# Patient Record
Sex: FEMALE | Race: WHITE | NOT HISPANIC OR LATINO | Employment: FULL TIME | URBAN - METROPOLITAN AREA
[De-identification: names, ages, dates, MRNs, and addresses within clinical notes are randomized per-mention and may not be internally consistent; named-entity substitution may affect disease eponyms.]

---

## 2022-03-04 ENCOUNTER — OFFICE VISIT (OUTPATIENT)
Dept: INTERNAL MEDICINE | Facility: CLINIC | Age: 52
End: 2022-03-04

## 2022-03-04 ENCOUNTER — LAB (OUTPATIENT)
Dept: LAB | Facility: HOSPITAL | Age: 52
End: 2022-03-04

## 2022-03-04 VITALS
SYSTOLIC BLOOD PRESSURE: 122 MMHG | BODY MASS INDEX: 38.73 KG/M2 | TEMPERATURE: 97.5 F | WEIGHT: 218.6 LBS | DIASTOLIC BLOOD PRESSURE: 90 MMHG | HEART RATE: 90 BPM | OXYGEN SATURATION: 99 % | HEIGHT: 63 IN

## 2022-03-04 DIAGNOSIS — M25.562 CHRONIC PAIN OF BOTH KNEES: ICD-10-CM

## 2022-03-04 DIAGNOSIS — M25.561 CHRONIC PAIN OF BOTH KNEES: ICD-10-CM

## 2022-03-04 DIAGNOSIS — Z12.11 COLON CANCER SCREENING: ICD-10-CM

## 2022-03-04 DIAGNOSIS — R53.82 CHRONIC FATIGUE: ICD-10-CM

## 2022-03-04 DIAGNOSIS — M25.50 MULTIPLE JOINT PAIN: Primary | ICD-10-CM

## 2022-03-04 DIAGNOSIS — G89.29 CHRONIC PAIN OF BOTH KNEES: ICD-10-CM

## 2022-03-04 DIAGNOSIS — M25.50 MULTIPLE JOINT PAIN: ICD-10-CM

## 2022-03-04 LAB
ALBUMIN SERPL-MCNC: 4.4 G/DL (ref 3.5–5.2)
ALBUMIN/GLOB SERPL: 1.5 G/DL
ALP SERPL-CCNC: 67 U/L (ref 39–117)
ALT SERPL W P-5'-P-CCNC: 17 U/L (ref 1–33)
ANION GAP SERPL CALCULATED.3IONS-SCNC: 9.6 MMOL/L (ref 5–15)
AST SERPL-CCNC: 20 U/L (ref 1–32)
BILIRUB SERPL-MCNC: 0.5 MG/DL (ref 0–1.2)
BUN SERPL-MCNC: 13 MG/DL (ref 6–20)
BUN/CREAT SERPL: 16 (ref 7–25)
CALCIUM SPEC-SCNC: 9.5 MG/DL (ref 8.6–10.5)
CHLORIDE SERPL-SCNC: 103 MMOL/L (ref 98–107)
CHOLEST SERPL-MCNC: 229 MG/DL (ref 0–200)
CHROMATIN AB SERPL-ACNC: 10.8 IU/ML (ref 0–14)
CO2 SERPL-SCNC: 26.4 MMOL/L (ref 22–29)
CREAT SERPL-MCNC: 0.81 MG/DL (ref 0.57–1)
CRP SERPL-MCNC: 0.36 MG/DL (ref 0–0.5)
DEPRECATED RDW RBC AUTO: 41.8 FL (ref 37–54)
EGFRCR SERPLBLD CKD-EPI 2021: 88.6 ML/MIN/1.73
ERYTHROCYTE [DISTWIDTH] IN BLOOD BY AUTOMATED COUNT: 13.1 % (ref 12.3–15.4)
ERYTHROCYTE [SEDIMENTATION RATE] IN BLOOD: 14 MM/HR (ref 0–20)
GLOBULIN UR ELPH-MCNC: 2.9 GM/DL
GLUCOSE SERPL-MCNC: 82 MG/DL (ref 65–99)
HCT VFR BLD AUTO: 40.8 % (ref 34–46.6)
HDLC SERPL-MCNC: 67 MG/DL (ref 40–60)
HGB BLD-MCNC: 13.5 G/DL (ref 12–15.9)
LDLC SERPL CALC-MCNC: 143 MG/DL (ref 0–100)
LDLC/HDLC SERPL: 2.09 {RATIO}
MCH RBC QN AUTO: 28.5 PG (ref 26.6–33)
MCHC RBC AUTO-ENTMCNC: 33.1 G/DL (ref 31.5–35.7)
MCV RBC AUTO: 86.3 FL (ref 79–97)
PLATELET # BLD AUTO: 290 10*3/MM3 (ref 140–450)
PMV BLD AUTO: 11.3 FL (ref 6–12)
POTASSIUM SERPL-SCNC: 4.5 MMOL/L (ref 3.5–5.2)
PROT SERPL-MCNC: 7.3 G/DL (ref 6–8.5)
RBC # BLD AUTO: 4.73 10*6/MM3 (ref 3.77–5.28)
SODIUM SERPL-SCNC: 139 MMOL/L (ref 136–145)
TRIGL SERPL-MCNC: 109 MG/DL (ref 0–150)
TSH SERPL DL<=0.05 MIU/L-ACNC: 2.12 UIU/ML (ref 0.27–4.2)
URATE SERPL-MCNC: 4.8 MG/DL (ref 2.4–5.7)
VLDLC SERPL-MCNC: 19 MG/DL (ref 5–40)
WBC NRBC COR # BLD: 6.86 10*3/MM3 (ref 3.4–10.8)

## 2022-03-04 PROCEDURE — 86038 ANTINUCLEAR ANTIBODIES: CPT | Performed by: NURSE PRACTITIONER

## 2022-03-04 PROCEDURE — 99204 OFFICE O/P NEW MOD 45 MIN: CPT | Performed by: NURSE PRACTITIONER

## 2022-03-04 PROCEDURE — 85652 RBC SED RATE AUTOMATED: CPT | Performed by: NURSE PRACTITIONER

## 2022-03-04 PROCEDURE — 84550 ASSAY OF BLOOD/URIC ACID: CPT | Performed by: NURSE PRACTITIONER

## 2022-03-04 PROCEDURE — 80061 LIPID PANEL: CPT | Performed by: NURSE PRACTITIONER

## 2022-03-04 PROCEDURE — 86140 C-REACTIVE PROTEIN: CPT | Performed by: NURSE PRACTITIONER

## 2022-03-04 PROCEDURE — 86431 RHEUMATOID FACTOR QUANT: CPT | Performed by: NURSE PRACTITIONER

## 2022-03-04 PROCEDURE — 86200 CCP ANTIBODY: CPT | Performed by: NURSE PRACTITIONER

## 2022-03-04 PROCEDURE — 80050 GENERAL HEALTH PANEL: CPT | Performed by: NURSE PRACTITIONER

## 2022-03-04 RX ORDER — MELOXICAM 7.5 MG/1
TABLET ORAL
Qty: 60 TABLET | Refills: 1 | Status: SHIPPED | OUTPATIENT
Start: 2022-03-04 | End: 2022-03-16

## 2022-03-04 NOTE — PROGRESS NOTES
Subjective   Елена Sellers is a 51 y.o. female here to establish care.  Chief Complaint   Patient presents with   • Establish Care       History of Present Illness     Joint pain-if he is having bilateral knee and hand pain with some associated swelling she is concerned about the possibility of autoimmune disease.  She does report that her mother has RA.  She has never had labs to have herself evaluated for this.    Mamm scheduled for next week.     Reports a normal Pap in 10/2021    Is due for colon cancer screening.  She has no personal or familial history of colon cancer.      The following portions of the patient's history were reviewed and updated as appropriate: allergies, current medications, past family history, past medical history, past social history, past surgical history and problem list.    Review of Systems   Constitutional: Positive for diaphoresis and fatigue. Negative for fever and unexpected weight loss.   Eyes: Negative for blurred vision, double vision, pain and visual disturbance.   Respiratory: Negative for cough, chest tightness, shortness of breath and wheezing.    Cardiovascular: Negative for chest pain, palpitations and leg swelling.   Gastrointestinal: Negative for abdominal pain, constipation, diarrhea, nausea and vomiting.   Genitourinary: Negative for difficulty urinating, frequency and urgency.   Musculoskeletal: Positive for arthralgias and myalgias.   Skin: Negative for color change and rash.   Neurological: Negative for dizziness, weakness, light-headedness, headache and confusion.   Hematological: Negative for adenopathy. Does not bruise/bleed easily.   Psychiatric/Behavioral:        Mood swings              Allergies   Allergen Reactions   • Codeine Unknown - Low Severity     Past Medical History:   Diagnosis Date   • COVID 01/2022    mild- cough and joint pain only     Past Surgical History:   Procedure Laterality Date   • BREAST SURGERY  2017    reduction   • HYSTERECTOMY   "2008   • KNEE SURGERY Left     torn meniscus, patellar dislocation    • TONSILLECTOMY  1974   • TUBAL ABDOMINAL LIGATION  2002     Family History   Problem Relation Age of Onset   • Arthritis Mother    • COPD Mother    • Thyroid disease Mother    • Obesity Mother    • Hypertension Mother    • Breast cancer Mother         bilat mastectomy   • Obesity Father      Social History     Socioeconomic History   • Marital status:    Tobacco Use   • Smoking status: Never Smoker   • Smokeless tobacco: Never Used   Vaping Use   • Vaping Use: Never used   Substance and Sexual Activity   • Alcohol use: Yes     Alcohol/week: 4.0 standard drinks     Types: 4 Glasses of wine per week   • Drug use: Never   • Sexual activity: Yes     Partners: Male     Birth control/protection: None     Immunization History   Administered Date(s) Administered   • COVID-19 (PFIZER) PURPLE CAP 05/09/2021, 05/30/2021, 12/06/2021   • Influenza, Unspecified 12/06/2021   • Tdap 01/01/2016       Current Outpatient Medications:   •  meloxicam (Mobic) 7.5 MG tablet, Take 1-2 pills once a day as needed for pain, Disp: 60 tablet, Rfl: 1    Objective   Blood pressure 122/90, pulse 90, temperature 97.5 °F (36.4 °C), temperature source Infrared, height 160 cm (63\"), weight 99.2 kg (218 lb 9.6 oz), SpO2 99 %, not currently breastfeeding.  Physical Exam  Vitals and nursing note reviewed.   Constitutional:       General: She is not in acute distress.     Appearance: Normal appearance. She is well-developed. She is not diaphoretic.   HENT:      Head: Normocephalic and atraumatic.   Eyes:      Conjunctiva/sclera: Conjunctivae normal.   Neck:      Vascular: No JVD.   Cardiovascular:      Rate and Rhythm: Normal rate and regular rhythm.      Heart sounds: Normal heart sounds. No murmur heard.  Pulmonary:      Effort: Pulmonary effort is normal. No respiratory distress.      Breath sounds: Normal breath sounds.   Chest:      Chest wall: No tenderness.   Abdominal: "      General: Bowel sounds are normal. There is no distension.      Palpations: Abdomen is soft.      Tenderness: There is no abdominal tenderness.   Musculoskeletal:         General: Normal range of motion.      Cervical back: Normal range of motion.      Right knee: Crepitus present. No swelling, deformity or effusion. Tenderness present.      Left knee: Crepitus present. No swelling, deformity or effusion. Tenderness present.   Skin:     General: Skin is warm and dry.      Coloration: Skin is not pale.      Findings: No erythema.   Neurological:      Mental Status: She is alert and oriented to person, place, and time.   Psychiatric:         Behavior: Behavior normal.         Thought Content: Thought content normal.         Judgment: Judgment normal.         Assessment/Plan   Diagnoses and all orders for this visit:    1. Multiple joint pain (Primary)  -     CBC (No Diff); Future  -     Comprehensive Metabolic Panel; Future  -     Lipid Panel; Future  -     CHI With / DsDNA, RNP, Sjogrens A / B, Agudelo; Future  -     Cyclic Citrul Peptide Antibody, IgG / IgA; Future  -     C-reactive Protein; Future  -     Sedimentation Rate; Future  -     Rheumatoid Factor; Future  -     Uric Acid; Future  -     XR Knee 1 or 2 View Bilateral; Future  -     TSH Rfx On Abnormal To Free T4; Future    2. Chronic pain of both knees  -     CBC (No Diff); Future  -     Comprehensive Metabolic Panel; Future  -     Lipid Panel; Future  -     CHI With / DsDNA, RNP, Sjogrens A / B, Agudelo; Future  -     Cyclic Citrul Peptide Antibody, IgG / IgA; Future  -     C-reactive Protein; Future  -     Sedimentation Rate; Future  -     Rheumatoid Factor; Future  -     Uric Acid; Future  -     XR Knee 1 or 2 View Bilateral; Future  -     TSH Rfx On Abnormal To Free T4; Future  -     meloxicam (Mobic) 7.5 MG tablet; Take 1-2 pills once a day as needed for pain  Dispense: 60 tablet; Refill: 1    3. Chronic fatigue  -     CBC (No Diff); Future  -      Comprehensive Metabolic Panel; Future  -     Lipid Panel; Future  -     CHI With / DsDNA, RNP, Sjogrens A / B, Agudelo; Future  -     Cyclic Citrul Peptide Antibody, IgG / IgA; Future  -     C-reactive Protein; Future  -     Sedimentation Rate; Future  -     Rheumatoid Factor; Future  -     Uric Acid; Future  -     XR Knee 1 or 2 View Bilateral; Future  -     TSH Rfx On Abnormal To Free T4; Future    4. Colon cancer screening  -     Cologuard - Stool, Per Rectum; Future    Will check labs to further evaluate for any autoimmune diseases that may be contributing to her knee pain.  We will also go ahead and get her started on meloxicam  As needed.  Stop for GI side effects.  We will also have her get x-rays completed of bilateral knees to further evaluate  If symptoms are worsening, we can consider physical therapy or referral to specialist    Lower risk for colon cancer so we will start screening with Cologuard.  If positive will proceed with colonoscopy, if negative will proceed with every 3 year Cologuard       Return for and need to collect labs today, FU after XRays .  Plan of care discussed with pt. They verbalized understanding and agreement.     Dictated Utilizing Dragon Dictation   Please note that portions of this note were completed with a voice recognition program.   Part of this note may be an electronic transcription/translation of spoken language to printed text using the Dragon Dictation System.

## 2022-03-06 LAB — CCP IGA+IGG SERPL IA-ACNC: 6 UNITS (ref 0–19)

## 2022-03-08 LAB — ANA SER QL: NEGATIVE

## 2022-03-09 ENCOUNTER — PRIOR AUTHORIZATION (OUTPATIENT)
Dept: INTERNAL MEDICINE | Facility: CLINIC | Age: 52
End: 2022-03-09

## 2022-03-09 ENCOUNTER — TELEPHONE (OUTPATIENT)
Dept: INTERNAL MEDICINE | Facility: CLINIC | Age: 52
End: 2022-03-09

## 2022-03-09 NOTE — TELEPHONE ENCOUNTER
Received call from [employer] this morning needing additional clinical information regarding PA. I did give the clinical information over the phone.     Ref #: [id number]  Turn around time is listed as 5 calendar days.

## 2022-03-15 NOTE — TELEPHONE ENCOUNTER
Received determination from insurance company:   Denied:   We denied your request because we did not see what we need to approve the amount of the drug you asked us to pay for, (meloxicam 7.5 milligram tablet). The health plan has a limit on the amount of this drug, 30 tablets per 30 days. We did not see why you need more than this amount. We cannot approve the amount requested, but you may fill up to the limit. We based this decision on the Food and Drug Administration Approved Label: [employer].

## 2022-03-16 ENCOUNTER — TELEPHONE (OUTPATIENT)
Dept: INTERNAL MEDICINE | Facility: CLINIC | Age: 52
End: 2022-03-16

## 2022-03-16 RX ORDER — MELOXICAM 15 MG/1
15 TABLET ORAL DAILY PRN
Qty: 30 TABLET | Refills: 0 | Status: SHIPPED | OUTPATIENT
Start: 2022-03-16 | End: 2022-04-12

## 2022-03-16 RX ORDER — METHYLPREDNISOLONE 4 MG/1
TABLET ORAL
Qty: 21 TABLET | Refills: 0 | Status: SHIPPED | OUTPATIENT
Start: 2022-03-16 | End: 2022-09-20

## 2022-03-16 NOTE — TELEPHONE ENCOUNTER
Spoke with Jasmine and advised her of these recommendations and medication administration. She verbalized a good understanding. She would like to try the steroid pack in conjunction with rest, ice, compression and elevation (which she stated that she is already doing). She would like this sent into [facility].

## 2022-03-16 NOTE — TELEPHONE ENCOUNTER
Patient has already been informed that this medication was being called in during previous telephone call.

## 2022-03-16 NOTE — TELEPHONE ENCOUNTER
Caller: Елена Sellers    Relationship: Self    Best call back number: [phone]     What is the best time to reach you: ANYTIME    Who are you requesting to speak with (clinical staff, provider,  specific staff member): CLINICAL STAFF    Do you know the name of the person who called: SELF    What was the call regarding: PATIENT STATES THAT SHE IS RETUNING A CALL TO April.    Do you require a callback: YES

## 2022-03-16 NOTE — TELEPHONE ENCOUNTER
Ok, I sent the meloxicam 15 mg. She can take it or the 7.5mg but not both.   She can try ice and compression wrap on the knee as well. (Apply a compressive ACE bandage. Rest and elevate the affected painful area.  Apply cold compresses intermittently as needed.  As pain recedes, begin normal activities slowly as tolerated.  Call if symptoms persist.)  IF she is not running a fever/chilling or having general malaise, I could send her a steroid amari to try for the knee as well. She would not take the meloxicam at the same time as the steroid though

## 2022-03-16 NOTE — TELEPHONE ENCOUNTER
[facility] for the patient to return our call, office number was provided      HUB TO READ: Please check with Jasmine and see if she picked this up already. If we need to, we can send the 15 mg meloxicam pills for her

## 2022-03-16 NOTE — TELEPHONE ENCOUNTER
Spoke with the patient, she stated that she picked up the meloxicam as just one a day because this is what her insurance would pay for until it was able to get authorized. I informed her  that it was denied for BID and we could send in a 15MG tablet. She is agreeable to this. She also wanted to let you know that her knee is swollen more and she is having difficulty walking. She stated that she will see if she can get to a [facility] to be checked out.

## 2022-03-16 NOTE — TELEPHONE ENCOUNTER
Please check with Jasmine and see if she picked this up already. If we need to, we can send the 15 mg meloxicam pills for her

## 2022-03-17 ENCOUNTER — OFFICE VISIT (OUTPATIENT)
Dept: ORTHOPEDIC SURGERY | Facility: CLINIC | Age: 52
End: 2022-03-17

## 2022-03-17 VITALS
BODY MASS INDEX: 35.44 KG/M2 | WEIGHT: 200 LBS | HEIGHT: 63 IN | SYSTOLIC BLOOD PRESSURE: 120 MMHG | DIASTOLIC BLOOD PRESSURE: 79 MMHG

## 2022-03-17 DIAGNOSIS — M25.562 LEFT KNEE PAIN, UNSPECIFIED CHRONICITY: ICD-10-CM

## 2022-03-17 DIAGNOSIS — M17.12 PRIMARY OSTEOARTHRITIS OF LEFT KNEE: Primary | ICD-10-CM

## 2022-03-17 PROCEDURE — 99204 OFFICE O/P NEW MOD 45 MIN: CPT | Performed by: ORTHOPAEDIC SURGERY

## 2022-03-17 PROCEDURE — 20610 DRAIN/INJ JOINT/BURSA W/O US: CPT | Performed by: ORTHOPAEDIC SURGERY

## 2022-03-17 RX ORDER — LIDOCAINE HYDROCHLORIDE 10 MG/ML
3 INJECTION, SOLUTION EPIDURAL; INFILTRATION; INTRACAUDAL; PERINEURAL
Status: COMPLETED | OUTPATIENT
Start: 2022-03-17 | End: 2022-03-17

## 2022-03-17 RX ORDER — TRIAMCINOLONE ACETONIDE 40 MG/ML
80 INJECTION, SUSPENSION INTRA-ARTICULAR; INTRAMUSCULAR
Status: COMPLETED | OUTPATIENT
Start: 2022-03-17 | End: 2022-03-17

## 2022-03-17 RX ADMIN — LIDOCAINE HYDROCHLORIDE 3 ML: 10 INJECTION, SOLUTION EPIDURAL; INFILTRATION; INTRACAUDAL; PERINEURAL at 10:44

## 2022-03-17 RX ADMIN — TRIAMCINOLONE ACETONIDE 80 MG: 40 INJECTION, SUSPENSION INTRA-ARTICULAR; INTRAMUSCULAR at 10:44

## 2022-03-17 NOTE — PROGRESS NOTES
Orthopaedic Clinic Note: Knee New Patient    Chief Complaint   Patient presents with   • Left Knee - Pain, Initial Evaluation        HPI  Consult from: REAL Tineo    Елена Sellers is a 51 y.o. female who presents with left knee pain for 2 month(s). Onset atraumatic and gradual in nature. Pain is localized to the medial joint line, patella and is a 4/10 on the pain scale. Pain is described as stabbing and stinging. Associated symptoms include pain, swelling and stiffness. The pain is worse with walking, lying on affected side, rising from seated position and full extension; nothing makes it better. Previous treatments have included: NSAIDS and oral steroids since symptom onset. Although some transient relief was reported with these interventions, these conservative measures have failed and symptoms have persisted. The patient is limited in daily activities and has had a significant decrease in quality of life as a result. She denies fevers, chills, or constitutional symptoms.    I have reviewed the following portions of the patient's history:History of Present Illness    Past Medical History:   Diagnosis Date   • COVID 01/2022    mild- cough and joint pain only      Past Surgical History:   Procedure Laterality Date   • BREAST SURGERY  2017    reduction   • HYSTERECTOMY  2008   • KNEE SURGERY Left     torn meniscus, patellar dislocation    • TONSILLECTOMY  1974   • TUBAL ABDOMINAL LIGATION  2002      Family History   Problem Relation Age of Onset   • Arthritis Mother    • COPD Mother    • Thyroid disease Mother    • Obesity Mother    • Hypertension Mother    • Breast cancer Mother         bilat mastectomy   • Obesity Father      Social History     Socioeconomic History   • Marital status:    Tobacco Use   • Smoking status: Never Smoker   • Smokeless tobacco: Never Used   Vaping Use   • Vaping Use: Never used   Substance and Sexual Activity   • Alcohol use: Yes     Alcohol/week: 4.0 standard drinks     " Types: 4 Glasses of wine per week   • Drug use: Never   • Sexual activity: Yes     Partners: Male     Birth control/protection: None      Current Outpatient Medications on File Prior to Visit   Medication Sig Dispense Refill   • meloxicam (MOBIC) 15 MG tablet Take 1 tablet by mouth Daily As Needed for Moderate Pain . 30 tablet 0   • methylPREDNISolone (MEDROL) 4 MG dose pack Take as directed on package instructions. 21 tablet 0     Current Facility-Administered Medications on File Prior to Visit   Medication Dose Route Frequency Provider Last Rate Last Admin   • [COMPLETED] ketorolac (TORADOL) injection 60 mg  60 mg Intramuscular Once Nancy Leal PA   60 mg at 03/16/22 1344      Allergies   Allergen Reactions   • Codeine Unknown - Low Severity        Review of Systems   Constitutional: Negative.    HENT: Negative.    Eyes: Negative.    Respiratory: Negative.    Cardiovascular: Negative.    Gastrointestinal: Negative.    Endocrine: Negative.    Genitourinary: Negative.    Musculoskeletal: Positive for arthralgias.   Skin: Negative.    Allergic/Immunologic: Negative.    Neurological: Negative.    Hematological: Negative.    Psychiatric/Behavioral: Negative.         The patient's Review of Systems was personally reviewed and confirmed as accurate.    The following portions of the patient's history were reviewed and updated as appropriate: allergies, current medications, past family history, past medical history, past social history, past surgical history and problem list.    Physical Exam  Blood pressure 120/79, height 160 cm (62.99\"), weight 90.7 kg (200 lb), not currently breastfeeding.    Body mass index is 35.44 kg/m².    GENERAL APPEARANCE: awake, alert & oriented x 3, in no acute distress, well developed, well nourished and obese  PSYCH: normal affect  LUNGS:  breathing nonlabored  EYES: sclera anicteric  CARDIOVASCULAR: palpable dorsalis pedis, palpable posterior tibial bilaterally. Capillary refill less " than 2 seconds  EXTREMITIES: no clubbing, cyanosis  GAIT:  Antalgic            Right Lower Extremity Exam:   ----------  Hip Exam  ----------  FLEXION CONTRACTURE: None  FLEXION: 110 degrees  INTERNAL ROTATION: 20 degrees at 90 degrees of flexion   EXTERNAL ROTATION: 40 degrees at 90 degrees of flexion    PAIN WITH HIP MOTION: no  ----------  Knee Exam  ----------  ALIGNMENT: Mild varus and correctable to neutral    RANGE OF MOTION:  Normal (0-120 degrees) with no extensor lag or flexion contracture  LIGAMENTOUS STABILITY:   stable to varus and valgus stress at terminal extension and 30 degrees without any evidence of laxity     STRENGTH:  5/5 knee flexion, extension. 5/5 ankle dorsiflexion and plantarflexion.     PAIN WITH PALPATION: denies tenderness to palpation about the knee, denies medial or lateral joint line pain  KNEE EFFUSION: no  PAIN WITH KNEE ROM: no  PATELLAR CREPITUS: yes, asymptomatic  SPECIAL EXAM FINDINGS:  Negative patellar compression    REFLEXES:  PATELLAR 2+/4  ACHILLES 2+/4    CLONUS: negative  STRAIGHT LEG TEST:   negative    SENSATION TO LIGHT TOUCH:  DEEP PERONEAL/SUPERFICIAL PERONEAL/SURAL/SAPHENOUS/TIBIAL:   intact    EDEMA:  no  ERYTHEMA:  no  WOUNDS/INCISIONS: no overlying skin problems.      Left Lower Extremity Exam:   ----------  Hip Exam  ----------  FLEXION CONTRACTURE: None  FLEXION: 110 degrees  INTERNAL ROTATION: 20 degrees at 90 degrees of flexion   EXTERNAL ROTATION: 40 degrees at 90 degrees of flexion    PAIN WITH HIP MOTION: no  ----------  Knee Exam  ----------  ALIGNMENT: severe varus, correctable to neutral    RANGE OF MOTION:  Decreased (10 - 100 degrees) with no extensor lag  LIGAMENTOUS STABILITY:   stable to varus and valgus stress at terminal extension and 30 degrees; retensioning of the MCL is appreciated with valgus stress at 30 degrees consistent with medial compartment degeneration     STRENGTH:  4/5 knee flexion, extension. 5/5 ankle dorsiflexion and  plantarflexion.     PAIN WITH PALPATION: global  KNEE EFFUSION: yes, mild effusion  PAIN WITH KNEE ROM: yes, global  PATELLAR CREPITUS: yes, painful and symptomatic  SPECIAL EXAM FINDINGS:  none    REFLEXES:  PATELLAR 2+/4  ACHILLES 2+/4    CLONUS: no  STRAIGHT LEG TEST:   negative    SENSATION TO LIGHT TOUCH:  DEEP PERONEAL/SUPERFICIAL PERONEAL/SURAL/SAPHENOUS/TIBIAL:   intact    EDEMA:  no  ERYTHEMA:  no  WOUNDS/INCISIONS:  no      ______________________________________________________________________  ______________________________________________________________________    RADIOGRAPHIC FINDINGS:   Indication: Left knee pain    Comparison: No prior xrays are available for comparison    Left knee(s) 4 views: moderate to severe tricompartmental arthritis with genu varum alignment, periarticular osteophytes visualized in all compartments      Assessment/Plan:   Diagnosis Plan   1. Primary osteoarthritis of left knee     2. Left knee pain, unspecified chronicity  XR Knee 4+ View Left     Patient experiencing an arthritic flareup of the left knee.  I discussed treatment options with her regarding her ongoing knee pain.  She is agreeable to intra-articular cortisone injection left knee today.  She will follow-up in 3 months for repeat assessment.    Procedure Note:  I discussed with the patient the potential benefits of performing a therapeutic injection of the left knee as well as potential risks including but not limited to infection, swelling, pain, bleeding, bruising, nerve/vessel damage, skin color changes, transient elevation in blood glucose levels, and fat atrophy. After informed consent and verifying correct patient, procedure site, and type of procedure, the area was prepped with alcohol, ethyl chloride was used to numb the skin. Via the superior lateral approach, 3cc of 1% lidocaine, 1 cc of 0.75% Marcaine and 2 cc of 40mg/ml of Kenalog were injected into the left knee. The patient tolerated the procedure  well. There were no complications. A sterile dressing was placed over the injection site.      Jesse Martines MD  03/17/22  10:45 EDT

## 2022-03-17 NOTE — PROGRESS NOTES
Procedure   Large Joint Arthrocentesis: L knee  Date/Time: 3/17/2022 10:44 AM  Consent given by: patient  Site marked: site marked  Timeout: Immediately prior to procedure a time out was called to verify the correct patient, procedure, equipment, support staff and site/side marked as required   Supporting Documentation  Indications: pain   Procedure Details  Location: knee - L knee  Preparation: Patient was prepped and draped in the usual sterile fashion  Needle size: 22 G  Approach: superior  Medications administered: 3 mL lidocaine PF 1% 1 %; 80 mg triamcinolone acetonide 40 MG/ML (1 cc Marcaine .75% NDC: 9726-2248-30; LOT: [id number]; EXP: 04/01/2023)  Patient tolerance: patient tolerated the procedure well with no immediate complications

## 2022-03-21 ENCOUNTER — TELEPHONE (OUTPATIENT)
Dept: INTERNAL MEDICINE | Facility: CLINIC | Age: 52
End: 2022-03-21

## 2022-03-21 NOTE — TELEPHONE ENCOUNTER
Received the patient's Cologuard results via fax. Reviewed them with Nisreen Rosas who stated that her test was Negative and to repeat in three years.     Patient was informed of results, verbalized a good understanding.

## 2022-04-12 RX ORDER — MELOXICAM 15 MG/1
TABLET ORAL
Qty: 30 TABLET | Refills: 0 | Status: SHIPPED | OUTPATIENT
Start: 2022-04-12 | End: 2022-05-26 | Stop reason: SDUPTHER

## 2022-04-12 NOTE — TELEPHONE ENCOUNTER
Rx Refill Note  Requested Prescriptions     Pending Prescriptions Disp Refills   • meloxicam (MOBIC) 15 MG tablet [Pharmacy Med Name: MELOXICAM 15 MG TABLET 15 Tablet] 30 tablet 0     Sig: TAKE ONE TABLET BY MOUTH EVERY DAY AS NEEDED FOR MODERATE PAIN      Last filled:  Last office visit with prescribing clinician: 3/4/2022      Next office visit with prescribing clinician: Visit date not found     April YASMIN Galloway MA  04/12/22, 14:30 EDT     Please advise if refill is appropriate as this was a medication prescribed for an acute indication.

## 2022-05-27 RX ORDER — METHYLPREDNISOLONE 4 MG/1
TABLET ORAL
Qty: 21 TABLET | Refills: 0 | OUTPATIENT
Start: 2022-05-27

## 2022-05-27 RX ORDER — MELOXICAM 15 MG/1
15 TABLET ORAL DAILY
Qty: 30 TABLET | Refills: 0 | Status: SHIPPED | OUTPATIENT
Start: 2022-05-27 | End: 2022-06-17 | Stop reason: ALTCHOICE

## 2022-06-17 ENCOUNTER — OFFICE VISIT (OUTPATIENT)
Dept: ORTHOPEDIC SURGERY | Facility: CLINIC | Age: 52
End: 2022-06-17

## 2022-06-17 VITALS
SYSTOLIC BLOOD PRESSURE: 132 MMHG | WEIGHT: 216.4 LBS | BODY MASS INDEX: 38.34 KG/M2 | DIASTOLIC BLOOD PRESSURE: 74 MMHG | HEIGHT: 63 IN

## 2022-06-17 DIAGNOSIS — M17.12 PRIMARY OSTEOARTHRITIS OF LEFT KNEE: Primary | ICD-10-CM

## 2022-06-17 PROCEDURE — 99214 OFFICE O/P EST MOD 30 MIN: CPT | Performed by: ORTHOPAEDIC SURGERY

## 2022-06-17 PROCEDURE — 20610 DRAIN/INJ JOINT/BURSA W/O US: CPT | Performed by: ORTHOPAEDIC SURGERY

## 2022-06-17 RX ORDER — LIDOCAINE HYDROCHLORIDE 10 MG/ML
3 INJECTION, SOLUTION EPIDURAL; INFILTRATION; INTRACAUDAL; PERINEURAL
Status: COMPLETED | OUTPATIENT
Start: 2022-06-17 | End: 2022-06-17

## 2022-06-17 RX ORDER — NAPROXEN 500 MG/1
500 TABLET ORAL 2 TIMES DAILY WITH MEALS
Qty: 60 TABLET | Refills: 3 | Status: SHIPPED | OUTPATIENT
Start: 2022-06-17 | End: 2023-01-24 | Stop reason: SDUPTHER

## 2022-06-17 RX ORDER — TRIAMCINOLONE ACETONIDE 40 MG/ML
80 INJECTION, SUSPENSION INTRA-ARTICULAR; INTRAMUSCULAR
Status: COMPLETED | OUTPATIENT
Start: 2022-06-17 | End: 2022-06-17

## 2022-06-17 RX ADMIN — LIDOCAINE HYDROCHLORIDE 3 ML: 10 INJECTION, SOLUTION EPIDURAL; INFILTRATION; INTRACAUDAL; PERINEURAL at 11:10

## 2022-06-17 RX ADMIN — TRIAMCINOLONE ACETONIDE 80 MG: 40 INJECTION, SUSPENSION INTRA-ARTICULAR; INTRAMUSCULAR at 11:10

## 2022-06-17 NOTE — PROGRESS NOTES
Orthopaedic Clinic Note: Knee Established Patient    Chief Complaint   Patient presents with   • Follow-up     3 months- Primary osteoarthritis of left knee         HPI    It has been 3  month(s) since Ms. Sellers's last visit. She returns to clinic today for follow-up left knee osteoarthritis.  She is previously seen 3 months ago and underwent intra-articular injection in the knee.  She states the injection provided about a month of relief before pain returned.  She comes to clinic today rating her pain a 3/10 on the pain scale.  She states that with physical activity her pain increases significantly.  She is having recurrent swelling and stiffness in the knee that is been refractory to the meloxicam.  She states that over-the-counter Aleve does help somewhat better than the meloxicam.  Overall she is doing about the same.    Past Medical History:   Diagnosis Date   • COVID 01/2022    mild- cough and joint pain only      Past Surgical History:   Procedure Laterality Date   • BREAST SURGERY  2017    reduction   • HYSTERECTOMY  2008   • KNEE SURGERY Left     torn meniscus, patellar dislocation    • TONSILLECTOMY  1974   • TUBAL ABDOMINAL LIGATION  2002      Family History   Problem Relation Age of Onset   • Arthritis Mother    • COPD Mother    • Thyroid disease Mother    • Obesity Mother    • Hypertension Mother    • Breast cancer Mother         bilat mastectomy   • Obesity Father      Social History     Socioeconomic History   • Marital status:    Tobacco Use   • Smoking status: Never Smoker   • Smokeless tobacco: Never Used   Vaping Use   • Vaping Use: Never used   Substance and Sexual Activity   • Alcohol use: Yes     Alcohol/week: 4.0 standard drinks     Types: 4 Glasses of wine per week   • Drug use: Never   • Sexual activity: Yes     Partners: Male     Birth control/protection: None      Current Outpatient Medications on File Prior to Visit   Medication Sig Dispense Refill   • [DISCONTINUED] meloxicam  "(MOBIC) 15 MG tablet Take 1 tablet by mouth Daily. 30 tablet 0   • methylPREDNISolone (MEDROL) 4 MG dose pack Take as directed on package instructions. 21 tablet 0     No current facility-administered medications on file prior to visit.      Allergies   Allergen Reactions   • Codeine Unknown - Low Severity        Review of Systems   Constitutional: Negative.    HENT: Negative.    Eyes: Negative.    Respiratory: Negative.    Cardiovascular: Negative.    Gastrointestinal: Negative.    Endocrine: Negative.    Genitourinary: Negative.    Musculoskeletal: Positive for arthralgias.   Skin: Negative.    Allergic/Immunologic: Negative.    Neurological: Negative.    Hematological: Negative.    Psychiatric/Behavioral: Negative.         The patient's Review of Systems was personally reviewed and confirmed as accurate.    Physical Exam  Blood pressure 132/74, height 160 cm (62.99\"), weight 98.2 kg (216 lb 6.4 oz), not currently breastfeeding.    Body mass index is 38.34 kg/m².    GENERAL APPEARANCE: awake, alert, oriented, in no acute distress and well developed, well nourished  LUNGS:  breathing nonlabored  EXTREMITIES: no clubbing, cyanosis  PERIPHERAL PULSES: palpable dorsalis pedis and posterior tibial pulses bilaterally.    GAIT:  Antalgic        ----------  Left Knee Exam:  ----------  ALIGNMENT: severe varus, correctable to neutral  ----------  RANGE OF MOTION:  Decreased (10 - 110 degrees) with no extensor lag  LIGAMENTOUS STABILITY:   stable to varus and valgus stress at terminal extension and 30 degrees; retensioning of the MCL is appreciated with valgus stress at 30 degrees consistent with medial compartment degeneration  ----------  STRENGTH:  KNEE FLEXION 5/5  KNEE EXTENSION  5/5  ANKLE DORSIFLEXION  5/5  ANKLE PLANTARFLEXION  5/5  ----------  PAIN WITH PALPATION:medial joint line and anterior knee  KNEE EFFUSION: yes, trace effusion  PAIN WITH KNEE ROM: yes  PATELLAR CREPITUS:  yes, painful and " symptomatic  ----------  SENSATION TO LIGHT TOUCH:  DEEP PERONEAL/SUPERFICIAL PERONEAL/SURAL/SAPHENOUS/TIBIAL:    intact  ----------  EDEMA:  no  ERYTHEMA:    no  WOUNDS/INCISIONS:   no  _____________________________________________________________________  _____________________________________________________________________    RADIOGRAPHIC FINDINGS:   No new imaging today    Assessment/Plan:   Diagnosis Plan   1. Primary osteoarthritis of left knee  naproxen (NAPROSYN) 500 MG tablet     Patient is experiencing recurrent arthritis of the knee.  I discussed treatment options with her.  She wishes to pursue prescription anti-inflammatory treatment.  We will discontinue the Mobic and place her on prescription strength Naprosyn.  In addition this will repeat cortisone injection in the left knee today.  She will follow-up in 3 months for repeat assessment.  X-ray 4 views left knee on return.    Procedure Note:  I discussed with the patient the potential benefits of performing a therapeutic injection of the left knee as well as potential risks including but not limited to infection, swelling, pain, bleeding, bruising, nerve/vessel damage, skin color changes, transient elevation in blood glucose levels, and fat atrophy. After informed consent and verifying correct patient, procedure site, and type of procedure, the area was prepped with alcohol, ethyl chloride was used to numb the skin. Via the superolateral approach, 3cc of 1% lidocaine, 1 cc of 0.75% Marcaine and 2 cc of 40mg/ml of Kenalog were injected into the left knee. The patient tolerated the procedure well. There were no complications. A sterile dressing was placed over the injection site.        Jesse Martines MD  06/17/22  11:34 EDT

## 2022-06-17 NOTE — PROGRESS NOTES
Procedure   Large Joint Arthrocentesis: L knee  Date/Time: 6/17/2022 11:10 AM  Consent given by: patient  Site marked: site marked  Timeout: Immediately prior to procedure a time out was called to verify the correct patient, procedure, equipment, support staff and site/side marked as required   Supporting Documentation  Indications: pain   Procedure Details  Location: knee - L knee  Preparation: Patient was prepped and draped in the usual sterile fashion  Needle size: 22 G  Approach: anterolateral  Medications administered: 3 mL lidocaine PF 1% 1 %; 80 mg triamcinolone acetonide 40 MG/ML (1 cc Marcaine .75% NDC: 3251-5728-31; LOT: [id number]; EXP: 04/01/2023)  Patient tolerance: patient tolerated the procedure well with no immediate complications

## 2022-09-20 ENCOUNTER — OFFICE VISIT (OUTPATIENT)
Dept: ORTHOPEDIC SURGERY | Facility: CLINIC | Age: 52
End: 2022-09-20

## 2022-09-20 VITALS
BODY MASS INDEX: 38.06 KG/M2 | HEIGHT: 63 IN | WEIGHT: 214.8 LBS | DIASTOLIC BLOOD PRESSURE: 110 MMHG | SYSTOLIC BLOOD PRESSURE: 166 MMHG

## 2022-09-20 DIAGNOSIS — M17.12 PRIMARY OSTEOARTHRITIS OF LEFT KNEE: Primary | ICD-10-CM

## 2022-09-20 PROCEDURE — 20610 DRAIN/INJ JOINT/BURSA W/O US: CPT | Performed by: ORTHOPAEDIC SURGERY

## 2022-09-20 PROCEDURE — 99214 OFFICE O/P EST MOD 30 MIN: CPT | Performed by: ORTHOPAEDIC SURGERY

## 2022-09-20 RX ORDER — BUPIVACAINE HYDROCHLORIDE 2.5 MG/ML
3 INJECTION, SOLUTION EPIDURAL; INFILTRATION; INTRACAUDAL
Status: COMPLETED | OUTPATIENT
Start: 2022-09-20 | End: 2022-09-20

## 2022-09-20 RX ORDER — PREGABALIN 75 MG/1
75 CAPSULE ORAL ONCE
Status: CANCELLED | OUTPATIENT
Start: 2022-09-20 | End: 2022-09-20

## 2022-09-20 RX ORDER — MELOXICAM 7.5 MG/1
15 TABLET ORAL ONCE
Status: CANCELLED | OUTPATIENT
Start: 2022-09-20 | End: 2022-09-20

## 2022-09-20 RX ORDER — LIDOCAINE HYDROCHLORIDE 10 MG/ML
3 INJECTION, SOLUTION EPIDURAL; INFILTRATION; INTRACAUDAL; PERINEURAL
Status: COMPLETED | OUTPATIENT
Start: 2022-09-20 | End: 2022-09-20

## 2022-09-20 RX ORDER — ACETAMINOPHEN 325 MG/1
1000 TABLET ORAL ONCE
Status: CANCELLED | OUTPATIENT
Start: 2022-09-20 | End: 2022-09-20

## 2022-09-20 RX ORDER — TRIAMCINOLONE ACETONIDE 40 MG/ML
80 INJECTION, SUSPENSION INTRA-ARTICULAR; INTRAMUSCULAR
Status: COMPLETED | OUTPATIENT
Start: 2022-09-20 | End: 2022-09-20

## 2022-09-20 RX ADMIN — BUPIVACAINE HYDROCHLORIDE 3 ML: 2.5 INJECTION, SOLUTION EPIDURAL; INFILTRATION; INTRACAUDAL at 08:42

## 2022-09-20 RX ADMIN — LIDOCAINE HYDROCHLORIDE 3 ML: 10 INJECTION, SOLUTION EPIDURAL; INFILTRATION; INTRACAUDAL; PERINEURAL at 08:42

## 2022-09-20 RX ADMIN — TRIAMCINOLONE ACETONIDE 80 MG: 40 INJECTION, SUSPENSION INTRA-ARTICULAR; INTRAMUSCULAR at 08:42

## 2022-09-20 NOTE — PROGRESS NOTES
Orthopaedic Clinic Note: Knee Established Patient    Chief Complaint   Patient presents with   • Follow-up     3 month recheck - Primary osteoarthritis of left knee         HPI    It has been 3  month(s) since Ms. Sellers's last visit. She returns to clinic today for Follow-up left knee osteoarthritis.  She underwent cortisone injection left knee 3 months ago.  The injection worked well for about 2 months.  Pain is returned.  Rates her pain 6/10 on the pain scale.  She is having recurrent swelling and stiffness and pain is worse with walking weightbearing activities.  She denies fevers chills or constitutional symptoms.  Overall she is doing about the same.    Past Medical History:   Diagnosis Date   • COVID 01/2022    mild- cough and joint pain only      Past Surgical History:   Procedure Laterality Date   • BREAST SURGERY  2017    reduction   • HYSTERECTOMY  2008   • KNEE SURGERY Left     torn meniscus, patellar dislocation    • TONSILLECTOMY  1974   • TUBAL ABDOMINAL LIGATION  2002      Family History   Problem Relation Age of Onset   • Arthritis Mother    • COPD Mother    • Thyroid disease Mother    • Obesity Mother    • Hypertension Mother    • Breast cancer Mother         bilat mastectomy   • Obesity Father      Social History     Socioeconomic History   • Marital status:    Tobacco Use   • Smoking status: Never Smoker   • Smokeless tobacco: Never Used   Vaping Use   • Vaping Use: Never used   Substance and Sexual Activity   • Alcohol use: Yes     Alcohol/week: 4.0 standard drinks     Types: 4 Glasses of wine per week   • Drug use: Never   • Sexual activity: Yes     Partners: Male     Birth control/protection: None      Current Outpatient Medications on File Prior to Visit   Medication Sig Dispense Refill   • naproxen (NAPROSYN) 500 MG tablet Take 1 tablet by mouth 2 (Two) Times a Day With Meals. 60 tablet 3   • [DISCONTINUED] methylPREDNISolone (MEDROL) 4 MG dose pack Take as directed on package  "instructions. 21 tablet 0     No current facility-administered medications on file prior to visit.      Allergies   Allergen Reactions   • Codeine Unknown - Low Severity        Review of Systems   Constitutional: Negative.    HENT: Negative.    Eyes: Negative.    Respiratory: Negative.    Cardiovascular: Negative.    Gastrointestinal: Negative.    Endocrine: Negative.    Genitourinary: Negative.    Musculoskeletal: Positive for arthralgias.   Skin: Negative.    Allergic/Immunologic: Negative.    Neurological: Negative.    Hematological: Negative.    Psychiatric/Behavioral: Negative.         The patient's Review of Systems was personally reviewed and confirmed as accurate.    Physical Exam  Blood pressure (!) 166/110, height 160 cm (62.99\"), weight 97.4 kg (214 lb 12.8 oz), not currently breastfeeding.    Body mass index is 38.06 kg/m².    GENERAL APPEARANCE: awake, alert, oriented, in no acute distress, well developed, well nourished and obese  LUNGS:  breathing nonlabored  EXTREMITIES: no clubbing, cyanosis  PERIPHERAL PULSES: palpable dorsalis pedis and posterior tibial pulses bilaterally.    GAIT:  Antalgic        ----------  Left Knee Exam:  ----------  ALIGNMENT: severe varus, correctable to neutral  ----------  RANGE OF MOTION:  Decreased (10 - 110 degrees) with no extensor lag  LIGAMENTOUS STABILITY:   stable to varus and valgus stress at terminal extension and 30 degrees; retensioning of the MCL is appreciated with valgus stress at 30 degrees consistent with medial compartment degeneration  ----------  STRENGTH:  KNEE FLEXION 5/5  KNEE EXTENSION  5/5  ANKLE DORSIFLEXION  5/5  ANKLE PLANTARFLEXION  5/5  ----------  PAIN WITH PALPATION:medial joint line and anterior knee  KNEE EFFUSION: yes, mild effusion  PAIN WITH KNEE ROM: yes  PATELLAR CREPITUS:  yes, painful and symptomatic  ----------  SENSATION TO LIGHT TOUCH:  DEEP PERONEAL/SUPERFICIAL PERONEAL/SURAL/SAPHENOUS/TIBIAL:    intact  ----------  EDEMA:  " no  ERYTHEMA:    no  WOUNDS/INCISIONS:   no  _____________________________________________________________________  _____________________________________________________________________    RADIOGRAPHIC FINDINGS:   Indication: Left knee pain    Comparison: Todays xrays were compared to previous xrays from 3/17/2022    Knee films: moderate to severe tricompartmental arthritis with genu varum alignment with bone-on-bone articulation medial compartment, periarticular osteophytes visualized in all compartments and Radiographs demonstrate worsening deformity with advancing arthritic changes and wear compared to prior radiographs.    Assessment/Plan:   Diagnosis Plan   1. Primary osteoarthritis of left knee  XR Knee 4+ View Left    Case Request    CBC and Differential    Comprehensive metabolic panel    Protime-INR    APTT    Hemoglobin A1c    ECG 12 Lead    Nicotine & Metabolite, Quant    Case Request     Patient has end-stage arthritis left knee.  She is ready to proceed to total knee arthroplasty.  We will proceed with a cortisone injection in the left knee today followed by scheduling surgery 3 months out from today's injection date.  She will subsequently follow-up 3 weeks after her surgery date.    Procedure Note:  I discussed with the patient the potential benefits of performing a therapeutic injection of the left knee as well as potential risks including but not limited to infection, swelling, pain, bleeding, bruising, nerve/vessel damage, skin color changes, transient elevation in blood glucose levels, and fat atrophy. After informed consent and verifying correct patient, procedure site, and type of procedure, the area was prepped with alcohol, ethyl chloride was used to numb the skin. Via the superior lateral approach, 3cc of 1% lidocaine, 3 cc of 0.25% Marcaine and 2 cc of 40mg/ml of Kenalog were injected into the left knee. The patient tolerated the procedure well. There were no complications. A sterile  dressing was placed over the injection site.    The patient has clinical and radiographic evidence of end-stage left knee joint degeneration. Conservative measures have been tried for 3 months or longer, but have failed to adequately treat or improve the patient's symptoms. Pain is restricting the patient's daily activities as well as quality of life. The recommendation at this time is to proceed with a left total arthroplasty with the goal to improve patient function and pain. The risks, benefits, potential complications, and alternatives were discussed with the patient in detail. Risks included but were not limited to bleeding, infection, anesthesia risks, damage to neurovascular structures, osteolysis, aseptic loosening, instability, dislocation, pain, continued pain, iatrogenic fracture, possible need for future surgery including the potential for amputation, blood clots, myocardial infarction, stroke, and death. Gifty-operative blood management and the potential for blood transfusion were discussed with risks and options clearly outlined. Specific details of the surgical procedure, hospitalization, recovery, rehabilitation, and long-term precautions were also presented. Pre-operative teaching was provided. Implant/prosthesis selection was outlined, and the many options available were explained; the final choice will be made at the time of the procedure to match the anatomy and condition of the bone, ligaments, tendons, and muscles. Given this instruction, the patient elected to proceed with the left total knee arthroplasty. The patient will be seen by pre-admission testing for pre-operative optimization and risk assessment and will be scheduled for surgery once this is completed.    The patient is considered standard risk for DVT based on patient risk factors and will be placed on aspirin postoperatively for DVT prophylaxis.          Jesse Martines MD  09/20/22  08:45 EDT

## 2022-09-20 NOTE — PROGRESS NOTES
Procedure   Large Joint Arthrocentesis: L knee  Date/Time: 9/20/2022 8:42 AM  Consent given by: patient  Site marked: site marked  Timeout: Immediately prior to procedure a time out was called to verify the correct patient, procedure, equipment, support staff and site/side marked as required   Supporting Documentation  Indications: pain   Procedure Details  Location: knee - L knee  Preparation: Patient was prepped and draped in the usual sterile fashion  Needle size: 22 G  Approach: superior  Medications administered: 3 mL bupivacaine (PF) 0.25 %; 3 mL lidocaine PF 1% 1 %; 80 mg triamcinolone acetonide 40 MG/ML  Patient tolerance: patient tolerated the procedure well with no immediate complications

## 2022-12-22 ENCOUNTER — PRE-ADMISSION TESTING (OUTPATIENT)
Dept: PREADMISSION TESTING | Facility: HOSPITAL | Age: 52
End: 2022-12-22

## 2022-12-22 VITALS — WEIGHT: 218.03 LBS | BODY MASS INDEX: 38.63 KG/M2 | HEIGHT: 63 IN

## 2022-12-22 DIAGNOSIS — M17.12 PRIMARY OSTEOARTHRITIS OF LEFT KNEE: ICD-10-CM

## 2022-12-22 LAB
ALBUMIN SERPL-MCNC: 4.4 G/DL (ref 3.5–5.2)
ALBUMIN/GLOB SERPL: 1.6 G/DL
ALP SERPL-CCNC: 64 U/L (ref 39–117)
ALT SERPL W P-5'-P-CCNC: 11 U/L (ref 1–33)
ANION GAP SERPL CALCULATED.3IONS-SCNC: 10 MMOL/L (ref 5–15)
APTT PPP: 28.4 SECONDS (ref 22–39)
AST SERPL-CCNC: 16 U/L (ref 1–32)
BASOPHILS # BLD AUTO: 0.02 10*3/MM3 (ref 0–0.2)
BASOPHILS NFR BLD AUTO: 0.2 % (ref 0–1.5)
BILIRUB SERPL-MCNC: 0.5 MG/DL (ref 0–1.2)
BUN SERPL-MCNC: 12 MG/DL (ref 6–20)
BUN/CREAT SERPL: 14 (ref 7–25)
CALCIUM SPEC-SCNC: 9.3 MG/DL (ref 8.6–10.5)
CHLORIDE SERPL-SCNC: 106 MMOL/L (ref 98–107)
CO2 SERPL-SCNC: 25 MMOL/L (ref 22–29)
CREAT SERPL-MCNC: 0.86 MG/DL (ref 0.57–1)
DEPRECATED RDW RBC AUTO: 43.3 FL (ref 37–54)
EGFRCR SERPLBLD CKD-EPI 2021: 81.9 ML/MIN/1.73
EOSINOPHIL # BLD AUTO: 0.07 10*3/MM3 (ref 0–0.4)
EOSINOPHIL NFR BLD AUTO: 0.9 % (ref 0.3–6.2)
ERYTHROCYTE [DISTWIDTH] IN BLOOD BY AUTOMATED COUNT: 13.9 % (ref 12.3–15.4)
GLOBULIN UR ELPH-MCNC: 2.8 GM/DL
GLUCOSE SERPL-MCNC: 109 MG/DL (ref 65–99)
HBA1C MFR BLD: 5 % (ref 4.8–5.6)
HCT VFR BLD AUTO: 39.2 % (ref 34–46.6)
HGB BLD-MCNC: 13.2 G/DL (ref 12–15.9)
IMM GRANULOCYTES # BLD AUTO: 0.03 10*3/MM3 (ref 0–0.05)
IMM GRANULOCYTES NFR BLD AUTO: 0.4 % (ref 0–0.5)
INR PPP: 0.96 (ref 0.84–1.13)
LYMPHOCYTES # BLD AUTO: 3.06 10*3/MM3 (ref 0.7–3.1)
LYMPHOCYTES NFR BLD AUTO: 37.5 % (ref 19.6–45.3)
MCH RBC QN AUTO: 28.8 PG (ref 26.6–33)
MCHC RBC AUTO-ENTMCNC: 33.7 G/DL (ref 31.5–35.7)
MCV RBC AUTO: 85.4 FL (ref 79–97)
MONOCYTES # BLD AUTO: 0.72 10*3/MM3 (ref 0.1–0.9)
MONOCYTES NFR BLD AUTO: 8.8 % (ref 5–12)
NEUTROPHILS NFR BLD AUTO: 4.26 10*3/MM3 (ref 1.7–7)
NEUTROPHILS NFR BLD AUTO: 52.2 % (ref 42.7–76)
NRBC BLD AUTO-RTO: 0 /100 WBC (ref 0–0.2)
PLATELET # BLD AUTO: 279 10*3/MM3 (ref 140–450)
PMV BLD AUTO: 10.5 FL (ref 6–12)
POTASSIUM SERPL-SCNC: 3.9 MMOL/L (ref 3.5–5.2)
PROT SERPL-MCNC: 7.2 G/DL (ref 6–8.5)
PROTHROMBIN TIME: 12.6 SECONDS (ref 11.4–14.4)
QT INTERVAL: 366 MS
QTC INTERVAL: 417 MS
RBC # BLD AUTO: 4.59 10*6/MM3 (ref 3.77–5.28)
SODIUM SERPL-SCNC: 141 MMOL/L (ref 136–145)
WBC NRBC COR # BLD: 8.16 10*3/MM3 (ref 3.4–10.8)

## 2022-12-22 PROCEDURE — 36415 COLL VENOUS BLD VENIPUNCTURE: CPT

## 2022-12-22 PROCEDURE — G0480 DRUG TEST DEF 1-7 CLASSES: HCPCS

## 2022-12-22 PROCEDURE — 83036 HEMOGLOBIN GLYCOSYLATED A1C: CPT

## 2022-12-22 PROCEDURE — 85730 THROMBOPLASTIN TIME PARTIAL: CPT

## 2022-12-22 PROCEDURE — 85610 PROTHROMBIN TIME: CPT

## 2022-12-22 PROCEDURE — 93010 ELECTROCARDIOGRAM REPORT: CPT | Performed by: INTERNAL MEDICINE

## 2022-12-22 PROCEDURE — 80053 COMPREHEN METABOLIC PANEL: CPT

## 2022-12-22 PROCEDURE — 93005 ELECTROCARDIOGRAM TRACING: CPT

## 2022-12-22 PROCEDURE — 85025 COMPLETE CBC W/AUTO DIFF WBC: CPT

## 2022-12-22 NOTE — PAT
An arrival time for procedure was not provided during PAT visit. If patient had any questions or concerns about their arrival time, they were instructed to contact their surgeon/physician.  Additionally, if the patient referred to an arrival time that was acquired from their my chart account, patient was encouraged to verify that time with their surgeon/physician. Arrival times are NOT provided in Pre Admission Testing Department.    Patient viewed general PAT education video as instructed in their preoperative information received from their surgeon.  Patient stated the general PAT education video was viewed in its entirety and survey completed.  Copies of PAT general education handouts (Incentive Spirometry, Meds to Beds Program, Patient Belongings, Pre-op skin preparation instructions, Blood Glucose testing, Visitor policy, Surgery FAQ, Code H) distributed to patient if not printed. Education related to the PAT pass and skin preparation for surgery (if applicable) completed in PAT as a reinforcement to PAT education video. Patient instructed to return PAT pass provided today as well as completed skin preparation sheet (if applicable) on the day of procedure.     Additionally if patient had not viewed video yet but intended to view it at home or in our waiting area, then referred them to the handout with QR code/link provided during PAT visit.  Instructed patient to complete survey after viewing the video in its entirety.  Encouraged patient/family to read PAT general education handouts thoroughly and notify PAT staff with any questions or concerns. Patient verbalized understanding of all information and priority content.    Patient instructed to drink 20 ounces of Gatorade and it needs to be completed 1 hour (for Main OR patients) or 2 hours (scheduled  section & [facility] patients) before given arrival time for procedure (NO RED Gatorade)    Patient verbalized understanding.    Patient denies any current  skin issues.     Patient to apply Chlorhexadine wipes  to surgical area (as instructed) the night before procedure and the AM of procedure. Wipes provided.    Prescription for Bactroban (if prescribed) and Chlorhexidine shower called into patient's pharmacy or [facility] pharmacy by patient's surgeon.  Reinforced with patient to  the prescription from applicable pharmacy if they haven't already.  Verbal and written instructions given regarding proper use of the Bactroban (if prescribed) and Chlorhexidine to patient and/or famlily during PAT visit. Patient/family also instructed to complete checklist and return it to Pre-op on the day of surgery.  Patient and/or family verbalized understanding.    Discussed with patient options for receiving total joint replacement education and assessed patient's ability and preference. Joint Replacement Guide given to patient during PAT visit since not received a copy within the last year. Encouraged patient/family to read guide thoroughly and notify PAT staff with any questions or concerns. Handout provided directing patient to links to watch online videos related to joint replacement surgery on the [facility] website. The handout gives detailed instructions for joining an online joint replacement class through Zoom or phone conference offered on Thursdays. Patient agreed to participate by watching videos online. VIDEOS TEMPORARILY UNAVAILABLE. PATIENT AGREED TO READ JOINT BOOK AND CALL WITH ANY CONCERNS OR QUESTIONS. Patient verbalized understanding of instructions and to complete the online learning tool survey. Encouraged to share information with family and/or . An overview of the joint replacement education was provided during the visit including general perioperative instructions that are routine for all surgical patients (PAT PASS, wipes, directions to pre-op, etc.).    Clean catch urinalysis not indicated because patient denied recent urinary frequency, urinary  urgency, burning/pain upon urination, or flank pain. No recent UTIs.

## 2022-12-29 LAB
COTININE SERPL-MCNC: <1 NG/ML
NICOTINE SERPL-MCNC: <1 NG/ML

## 2023-01-03 ENCOUNTER — ANESTHESIA EVENT (OUTPATIENT)
Dept: PERIOP | Facility: HOSPITAL | Age: 53
End: 2023-01-03
Payer: COMMERCIAL

## 2023-01-03 RX ORDER — SODIUM CHLORIDE 0.9 % (FLUSH) 0.9 %
10 SYRINGE (ML) INJECTION EVERY 12 HOURS SCHEDULED
Status: CANCELLED | OUTPATIENT
Start: 2023-01-03

## 2023-01-03 RX ORDER — FAMOTIDINE 10 MG/ML
20 INJECTION, SOLUTION INTRAVENOUS ONCE
Status: CANCELLED | OUTPATIENT
Start: 2023-01-03 | End: 2023-01-03

## 2023-01-04 ENCOUNTER — ANESTHESIA EVENT CONVERTED (OUTPATIENT)
Dept: ANESTHESIOLOGY | Facility: HOSPITAL | Age: 53
End: 2023-01-04
Payer: COMMERCIAL

## 2023-01-04 ENCOUNTER — ANESTHESIA (OUTPATIENT)
Dept: PERIOP | Facility: HOSPITAL | Age: 53
End: 2023-01-04
Payer: COMMERCIAL

## 2023-01-04 ENCOUNTER — READMISSION MANAGEMENT (OUTPATIENT)
Dept: CALL CENTER | Facility: HOSPITAL | Age: 53
End: 2023-01-04
Payer: COMMERCIAL

## 2023-01-04 ENCOUNTER — APPOINTMENT (OUTPATIENT)
Dept: GENERAL RADIOLOGY | Facility: HOSPITAL | Age: 53
End: 2023-01-04
Payer: COMMERCIAL

## 2023-01-04 ENCOUNTER — HOSPITAL ENCOUNTER (OUTPATIENT)
Facility: HOSPITAL | Age: 53
Discharge: HOME | End: 2023-01-04
Attending: ORTHOPAEDIC SURGERY | Admitting: ORTHOPAEDIC SURGERY
Payer: COMMERCIAL

## 2023-01-04 VITALS
TEMPERATURE: 98 F | BODY MASS INDEX: 38.63 KG/M2 | DIASTOLIC BLOOD PRESSURE: 87 MMHG | OXYGEN SATURATION: 98 % | SYSTOLIC BLOOD PRESSURE: 158 MMHG | HEIGHT: 63 IN | RESPIRATION RATE: 16 BRPM | WEIGHT: 218.03 LBS | HEART RATE: 82 BPM

## 2023-01-04 DIAGNOSIS — M17.12 PRIMARY OSTEOARTHRITIS OF LEFT KNEE: ICD-10-CM

## 2023-01-04 DIAGNOSIS — Z96.652 STATUS POST TOTAL LEFT KNEE REPLACEMENT: Primary | ICD-10-CM

## 2023-01-04 LAB — GLUCOSE BLDC GLUCOMTR-MCNC: 94 MG/DL (ref 70–130)

## 2023-01-04 PROCEDURE — 97161 PT EVAL LOW COMPLEX 20 MIN: CPT

## 2023-01-04 PROCEDURE — 97110 THERAPEUTIC EXERCISES: CPT

## 2023-01-04 PROCEDURE — G0378 HOSPITAL OBSERVATION PER HR: HCPCS

## 2023-01-04 PROCEDURE — 25010000002 DEXAMETHASONE PER 1 MG: Performed by: NURSE ANESTHETIST, CERTIFIED REGISTERED

## 2023-01-04 PROCEDURE — 25010000002 PROPOFOL 10 MG/ML EMULSION: Performed by: NURSE ANESTHETIST, CERTIFIED REGISTERED

## 2023-01-04 PROCEDURE — 25010000002 CEFAZOLIN IN DEXTROSE 2-4 GM/100ML-% SOLUTION: Performed by: ORTHOPAEDIC SURGERY

## 2023-01-04 PROCEDURE — C1755 CATHETER, INTRASPINAL: HCPCS | Performed by: ORTHOPAEDIC SURGERY

## 2023-01-04 PROCEDURE — 27447 TOTAL KNEE ARTHROPLASTY: CPT | Performed by: ORTHOPAEDIC SURGERY

## 2023-01-04 PROCEDURE — 25010000002 ROPIVACAINE PER 1 MG: Performed by: ORTHOPAEDIC SURGERY

## 2023-01-04 PROCEDURE — 25010000002 ROPIVACAINE PER 1 MG: Performed by: NURSE ANESTHETIST, CERTIFIED REGISTERED

## 2023-01-04 PROCEDURE — S0260 H&P FOR SURGERY: HCPCS | Performed by: PHYSICIAN ASSISTANT

## 2023-01-04 PROCEDURE — 25010000002 MORPHINE PER 10 MG: Performed by: ORTHOPAEDIC SURGERY

## 2023-01-04 PROCEDURE — 25010000002 KETOROLAC TROMETHAMINE PER 15 MG: Performed by: ORTHOPAEDIC SURGERY

## 2023-01-04 PROCEDURE — 25010000002 PROPOFOL 1000 MG/ML EMULSION: Performed by: NURSE ANESTHETIST, CERTIFIED REGISTERED

## 2023-01-04 PROCEDURE — C1776 JOINT DEVICE (IMPLANTABLE): HCPCS | Performed by: ORTHOPAEDIC SURGERY

## 2023-01-04 PROCEDURE — 27447 TOTAL KNEE ARTHROPLASTY: CPT | Performed by: PHYSICIAN ASSISTANT

## 2023-01-04 PROCEDURE — 73560 X-RAY EXAM OF KNEE 1 OR 2: CPT

## 2023-01-04 PROCEDURE — 25010000002 ONDANSETRON PER 1 MG: Performed by: NURSE ANESTHETIST, CERTIFIED REGISTERED

## 2023-01-04 PROCEDURE — 82962 GLUCOSE BLOOD TEST: CPT

## 2023-01-04 DEVICE — PATELLA
Type: IMPLANTABLE DEVICE | Site: KNEE | Status: FUNCTIONAL
Brand: TRIATHLON

## 2023-01-04 DEVICE — TIBIAL BEARING INSERT - CS
Type: IMPLANTABLE DEVICE | Site: KNEE | Status: FUNCTIONAL
Brand: TRIATHLON

## 2023-01-04 DEVICE — TIBIAL COMPONENT
Type: IMPLANTABLE DEVICE | Site: KNEE | Status: FUNCTIONAL
Brand: TRIATHLON

## 2023-01-04 DEVICE — DEV CONTRL TISS STRATAFIX SPIRAL PDO BIDIR 1 36X36CM: Type: IMPLANTABLE DEVICE | Site: KNEE | Status: FUNCTIONAL

## 2023-01-04 DEVICE — CRUCIATE RETAINING FEMORAL
Type: IMPLANTABLE DEVICE | Site: KNEE | Status: FUNCTIONAL
Brand: TRIATHLON

## 2023-01-04 DEVICE — CAP TOTAL KN CMTLS 4 PC: Type: IMPLANTABLE DEVICE | Site: KNEE | Status: FUNCTIONAL

## 2023-01-04 RX ORDER — MELOXICAM 7.5 MG/1
15 TABLET ORAL DAILY
Status: DISCONTINUED | OUTPATIENT
Start: 2023-01-04 | End: 2023-01-04 | Stop reason: HOSPADM

## 2023-01-04 RX ORDER — SODIUM CHLORIDE 0.9 % (FLUSH) 0.9 %
10 SYRINGE (ML) INJECTION AS NEEDED
Status: DISCONTINUED | OUTPATIENT
Start: 2023-01-04 | End: 2023-01-04 | Stop reason: HOSPADM

## 2023-01-04 RX ORDER — LIDOCAINE HYDROCHLORIDE 10 MG/ML
INJECTION, SOLUTION EPIDURAL; INFILTRATION; INTRACAUDAL; PERINEURAL AS NEEDED
Status: DISCONTINUED | OUTPATIENT
Start: 2023-01-04 | End: 2023-01-04 | Stop reason: SURG

## 2023-01-04 RX ORDER — PHENYLEPHRINE HCL IN 0.9% NACL 1 MG/10 ML
SYRINGE (ML) INTRAVENOUS AS NEEDED
Status: DISCONTINUED | OUTPATIENT
Start: 2023-01-04 | End: 2023-01-04 | Stop reason: SURG

## 2023-01-04 RX ORDER — SODIUM CHLORIDE 0.9 % (FLUSH) 0.9 %
3-10 SYRINGE (ML) INJECTION AS NEEDED
Status: DISCONTINUED | OUTPATIENT
Start: 2023-01-04 | End: 2023-01-04 | Stop reason: HOSPADM

## 2023-01-04 RX ORDER — BUPIVACAINE HYDROCHLORIDE 2.5 MG/ML
INJECTION, SOLUTION EPIDURAL; INFILTRATION; INTRACAUDAL
Status: DISCONTINUED | OUTPATIENT
Start: 2023-01-04 | End: 2023-01-04 | Stop reason: SURG

## 2023-01-04 RX ORDER — NALOXONE HCL 0.4 MG/ML
0.1 VIAL (ML) INJECTION
Status: DISCONTINUED | OUTPATIENT
Start: 2023-01-04 | End: 2023-01-04 | Stop reason: HOSPADM

## 2023-01-04 RX ORDER — OXYCODONE HYDROCHLORIDE 5 MG/1
10 TABLET ORAL EVERY 4 HOURS PRN
Status: DISCONTINUED | OUTPATIENT
Start: 2023-01-04 | End: 2023-01-04 | Stop reason: HOSPADM

## 2023-01-04 RX ORDER — MIDAZOLAM HYDROCHLORIDE 1 MG/ML
1 INJECTION INTRAMUSCULAR; INTRAVENOUS
Status: DISCONTINUED | OUTPATIENT
Start: 2023-01-04 | End: 2023-01-04 | Stop reason: HOSPADM

## 2023-01-04 RX ORDER — SODIUM CHLORIDE 0.9 % (FLUSH) 0.9 %
3 SYRINGE (ML) INJECTION EVERY 12 HOURS SCHEDULED
Status: DISCONTINUED | OUTPATIENT
Start: 2023-01-04 | End: 2023-01-04 | Stop reason: HOSPADM

## 2023-01-04 RX ORDER — CEFAZOLIN SODIUM 2 G/100ML
2 INJECTION, SOLUTION INTRAVENOUS ONCE
Status: COMPLETED | OUTPATIENT
Start: 2023-01-04 | End: 2023-01-04

## 2023-01-04 RX ORDER — ASPIRIN 81 MG/1
81 TABLET ORAL EVERY 12 HOURS SCHEDULED
Qty: 60 TABLET | Refills: 0 | Status: SHIPPED | OUTPATIENT
Start: 2023-01-05

## 2023-01-04 RX ORDER — ACETAMINOPHEN 500 MG
1000 TABLET ORAL ONCE
Status: COMPLETED | OUTPATIENT
Start: 2023-01-04 | End: 2023-01-04

## 2023-01-04 RX ORDER — CEFAZOLIN SODIUM 2 G/100ML
2 INJECTION, SOLUTION INTRAVENOUS EVERY 8 HOURS
Status: DISCONTINUED | OUTPATIENT
Start: 2023-01-04 | End: 2023-01-04 | Stop reason: HOSPADM

## 2023-01-04 RX ORDER — ONDANSETRON 2 MG/ML
INJECTION INTRAMUSCULAR; INTRAVENOUS AS NEEDED
Status: DISCONTINUED | OUTPATIENT
Start: 2023-01-04 | End: 2023-01-04 | Stop reason: SURG

## 2023-01-04 RX ORDER — DROPERIDOL 2.5 MG/ML
0.62 INJECTION, SOLUTION INTRAMUSCULAR; INTRAVENOUS ONCE AS NEEDED
Status: DISCONTINUED | OUTPATIENT
Start: 2023-01-04 | End: 2023-01-04 | Stop reason: HOSPADM

## 2023-01-04 RX ORDER — FENTANYL CITRATE 50 UG/ML
50 INJECTION, SOLUTION INTRAMUSCULAR; INTRAVENOUS
Status: DISCONTINUED | OUTPATIENT
Start: 2023-01-04 | End: 2023-01-04 | Stop reason: HOSPADM

## 2023-01-04 RX ORDER — OXYCODONE HYDROCHLORIDE 5 MG/1
5 TABLET ORAL EVERY 4 HOURS PRN
Status: DISCONTINUED | OUTPATIENT
Start: 2023-01-04 | End: 2023-01-04 | Stop reason: HOSPADM

## 2023-01-04 RX ORDER — HYDROMORPHONE HYDROCHLORIDE 1 MG/ML
0.5 INJECTION, SOLUTION INTRAMUSCULAR; INTRAVENOUS; SUBCUTANEOUS
Status: DISCONTINUED | OUTPATIENT
Start: 2023-01-04 | End: 2023-01-04 | Stop reason: HOSPADM

## 2023-01-04 RX ORDER — SODIUM CHLORIDE 9 MG/ML
40 INJECTION, SOLUTION INTRAVENOUS AS NEEDED
Status: DISCONTINUED | OUTPATIENT
Start: 2023-01-04 | End: 2023-01-04 | Stop reason: HOSPADM

## 2023-01-04 RX ORDER — PROPOFOL 10 MG/ML
VIAL (ML) INTRAVENOUS AS NEEDED
Status: DISCONTINUED | OUTPATIENT
Start: 2023-01-04 | End: 2023-01-04 | Stop reason: SURG

## 2023-01-04 RX ORDER — LABETALOL HYDROCHLORIDE 5 MG/ML
5 INJECTION, SOLUTION INTRAVENOUS
Status: DISCONTINUED | OUTPATIENT
Start: 2023-01-04 | End: 2023-01-04 | Stop reason: HOSPADM

## 2023-01-04 RX ORDER — MAGNESIUM HYDROXIDE 1200 MG/15ML
LIQUID ORAL AS NEEDED
Status: DISCONTINUED | OUTPATIENT
Start: 2023-01-04 | End: 2023-01-04 | Stop reason: HOSPADM

## 2023-01-04 RX ORDER — ONDANSETRON 2 MG/ML
4 INJECTION INTRAMUSCULAR; INTRAVENOUS EVERY 6 HOURS PRN
Status: DISCONTINUED | OUTPATIENT
Start: 2023-01-04 | End: 2023-01-04 | Stop reason: HOSPADM

## 2023-01-04 RX ORDER — TRANEXAMIC ACID 10 MG/ML
1000 INJECTION, SOLUTION INTRAVENOUS ONCE
Status: COMPLETED | OUTPATIENT
Start: 2023-01-04 | End: 2023-01-04

## 2023-01-04 RX ORDER — DEXAMETHASONE SODIUM PHOSPHATE 4 MG/ML
INJECTION, SOLUTION INTRA-ARTICULAR; INTRALESIONAL; INTRAMUSCULAR; INTRAVENOUS; SOFT TISSUE AS NEEDED
Status: DISCONTINUED | OUTPATIENT
Start: 2023-01-04 | End: 2023-01-04 | Stop reason: SURG

## 2023-01-04 RX ORDER — ROPIVACAINE HYDROCHLORIDE 2 MG/ML
1 INJECTION, SOLUTION EPIDURAL; INFILTRATION; PERINEURAL CONTINUOUS
Start: 2023-01-04 | End: 2023-01-24

## 2023-01-04 RX ORDER — SODIUM CHLORIDE 9 MG/ML
100 INJECTION, SOLUTION INTRAVENOUS CONTINUOUS
Status: DISCONTINUED | OUTPATIENT
Start: 2023-01-04 | End: 2023-01-04 | Stop reason: HOSPADM

## 2023-01-04 RX ORDER — LABETALOL HYDROCHLORIDE 5 MG/ML
10 INJECTION, SOLUTION INTRAVENOUS EVERY 4 HOURS PRN
Status: DISCONTINUED | OUTPATIENT
Start: 2023-01-04 | End: 2023-01-04 | Stop reason: HOSPADM

## 2023-01-04 RX ORDER — ONDANSETRON 4 MG/1
4 TABLET, FILM COATED ORAL EVERY 6 HOURS PRN
Status: DISCONTINUED | OUTPATIENT
Start: 2023-01-04 | End: 2023-01-04 | Stop reason: HOSPADM

## 2023-01-04 RX ORDER — ASPIRIN 81 MG/1
81 TABLET ORAL EVERY 12 HOURS SCHEDULED
Status: DISCONTINUED | OUTPATIENT
Start: 2023-01-05 | End: 2023-01-04 | Stop reason: HOSPADM

## 2023-01-04 RX ORDER — DOCUSATE SODIUM 100 MG/1
100 CAPSULE, LIQUID FILLED ORAL 2 TIMES DAILY
Qty: 60 CAPSULE | Refills: 0 | Status: SHIPPED | OUTPATIENT
Start: 2023-01-04

## 2023-01-04 RX ORDER — OXYCODONE HYDROCHLORIDE 5 MG/1
5 TABLET ORAL EVERY 4 HOURS PRN
Qty: 40 TABLET | Refills: 0 | Status: ON HOLD | OUTPATIENT
Start: 2023-01-04 | End: 2023-03-20

## 2023-01-04 RX ORDER — ACETAMINOPHEN 500 MG
1000 TABLET ORAL EVERY 8 HOURS
Status: DISCONTINUED | OUTPATIENT
Start: 2023-01-04 | End: 2023-01-04 | Stop reason: HOSPADM

## 2023-01-04 RX ORDER — LIDOCAINE HYDROCHLORIDE 10 MG/ML
0.5 INJECTION, SOLUTION EPIDURAL; INFILTRATION; INTRACAUDAL; PERINEURAL ONCE AS NEEDED
Status: COMPLETED | OUTPATIENT
Start: 2023-01-04 | End: 2023-01-04

## 2023-01-04 RX ORDER — ONDANSETRON 2 MG/ML
4 INJECTION INTRAMUSCULAR; INTRAVENOUS EVERY 6 HOURS PRN
Status: DISCONTINUED | OUTPATIENT
Start: 2023-01-04 | End: 2023-01-04 | Stop reason: SDUPTHER

## 2023-01-04 RX ORDER — ROPIVACAINE HYDROCHLORIDE 2 MG/ML
INJECTION, SOLUTION EPIDURAL; INFILTRATION; PERINEURAL CONTINUOUS
Status: DISCONTINUED | OUTPATIENT
Start: 2023-01-04 | End: 2023-01-04 | Stop reason: HOSPADM

## 2023-01-04 RX ORDER — FAMOTIDINE 20 MG/1
20 TABLET, FILM COATED ORAL ONCE
Status: COMPLETED | OUTPATIENT
Start: 2023-01-04 | End: 2023-01-04

## 2023-01-04 RX ORDER — SODIUM CHLORIDE, SODIUM LACTATE, POTASSIUM CHLORIDE, CALCIUM CHLORIDE 600; 310; 30; 20 MG/100ML; MG/100ML; MG/100ML; MG/100ML
9 INJECTION, SOLUTION INTRAVENOUS CONTINUOUS
Status: DISCONTINUED | OUTPATIENT
Start: 2023-01-04 | End: 2023-01-04 | Stop reason: HOSPADM

## 2023-01-04 RX ORDER — BUPIVACAINE HYDROCHLORIDE 5 MG/ML
INJECTION, SOLUTION PERINEURAL
Status: COMPLETED | OUTPATIENT
Start: 2023-01-04 | End: 2023-01-04

## 2023-01-04 RX ORDER — PREGABALIN 75 MG/1
75 CAPSULE ORAL ONCE
Status: COMPLETED | OUTPATIENT
Start: 2023-01-04 | End: 2023-01-04

## 2023-01-04 RX ORDER — ACETAMINOPHEN 500 MG
1000 TABLET ORAL EVERY 8 HOURS
Qty: 42 TABLET | Refills: 0 | Status: SHIPPED | OUTPATIENT
Start: 2023-01-04 | End: 2023-01-11

## 2023-01-04 RX ORDER — MELOXICAM 15 MG/1
15 TABLET ORAL ONCE
Status: COMPLETED | OUTPATIENT
Start: 2023-01-04 | End: 2023-01-04

## 2023-01-04 RX ADMIN — LIDOCAINE HYDROCHLORIDE 0.5 ML: 10 INJECTION, SOLUTION EPIDURAL; INFILTRATION; INTRACAUDAL; PERINEURAL at 06:37

## 2023-01-04 RX ADMIN — LIDOCAINE HYDROCHLORIDE 50 MG: 10 INJECTION, SOLUTION EPIDURAL; INFILTRATION; INTRACAUDAL at 07:25

## 2023-01-04 RX ADMIN — MELOXICAM 15 MG: 15 TABLET ORAL at 06:37

## 2023-01-04 RX ADMIN — MELOXICAM 15 MG: 7.5 TABLET ORAL at 12:39

## 2023-01-04 RX ADMIN — TRANEXAMIC ACID 1000 MG: 10 INJECTION, SOLUTION INTRAVENOUS at 07:35

## 2023-01-04 RX ADMIN — Medication 100 MCG: at 08:54

## 2023-01-04 RX ADMIN — OXYCODONE 10 MG: 5 TABLET ORAL at 15:14

## 2023-01-04 RX ADMIN — SODIUM CHLORIDE, POTASSIUM CHLORIDE, SODIUM LACTATE AND CALCIUM CHLORIDE 9 ML/HR: 600; 310; 30; 20 INJECTION, SOLUTION INTRAVENOUS at 06:37

## 2023-01-04 RX ADMIN — ACETAMINOPHEN 1000 MG: 500 TABLET ORAL at 12:39

## 2023-01-04 RX ADMIN — FAMOTIDINE 20 MG: 20 TABLET, FILM COATED ORAL at 06:37

## 2023-01-04 RX ADMIN — CEFAZOLIN SODIUM 2 G: 2 INJECTION, SOLUTION INTRAVENOUS at 12:39

## 2023-01-04 RX ADMIN — PROPOFOL 50 MG: 10 INJECTION, EMULSION INTRAVENOUS at 07:25

## 2023-01-04 RX ADMIN — PROPOFOL 50 MG: 10 INJECTION, EMULSION INTRAVENOUS at 07:27

## 2023-01-04 RX ADMIN — CEFAZOLIN SODIUM 2 G: 2 INJECTION, SOLUTION INTRAVENOUS at 07:22

## 2023-01-04 RX ADMIN — PREGABALIN 75 MG: 75 CAPSULE ORAL at 06:37

## 2023-01-04 RX ADMIN — MUPIROCIN 1 APPLICATION: 20 OINTMENT TOPICAL at 06:37

## 2023-01-04 RX ADMIN — TRANEXAMIC ACID 1000 MG: 10 INJECTION, SOLUTION INTRAVENOUS at 08:44

## 2023-01-04 RX ADMIN — ACETAMINOPHEN 1000 MG: 500 TABLET ORAL at 06:37

## 2023-01-04 RX ADMIN — DEXAMETHASONE SODIUM PHOSPHATE 8 MG: 4 INJECTION, SOLUTION INTRAMUSCULAR; INTRAVENOUS at 07:39

## 2023-01-04 RX ADMIN — BUPIVACAINE HYDROCHLORIDE 2 ML: 5 INJECTION, SOLUTION PERINEURAL at 07:30

## 2023-01-04 RX ADMIN — BUPIVACAINE HYDROCHLORIDE 20 ML: 2.5 INJECTION, SOLUTION EPIDURAL; INFILTRATION; INTRACAUDAL at 09:39

## 2023-01-04 RX ADMIN — PROPOFOL 50 MCG/KG/MIN: 10 INJECTION, EMULSION INTRAVENOUS at 07:34

## 2023-01-04 RX ADMIN — Medication 1000 MG: at 09:51

## 2023-01-04 RX ADMIN — ONDANSETRON 4 MG: 2 INJECTION INTRAMUSCULAR; INTRAVENOUS at 09:08

## 2023-01-04 RX ADMIN — SODIUM CHLORIDE, POTASSIUM CHLORIDE, SODIUM LACTATE AND CALCIUM CHLORIDE: 600; 310; 30; 20 INJECTION, SOLUTION INTRAVENOUS at 08:35

## 2023-01-04 NOTE — DISCHARGE INSTRUCTIONS
DISCHARGE INSTRUCTIONS   Dr. Martines     Total Knee Replacement/ Partial (Uni) Knee Replacement     Wound Care   1) Keep wound / incision area clean and dry.   2) Dressing to remain in place until post-operative day 7. Upon dressing removal, assess for wound drainage. If no drainage is present, keep wound / incision area open to air as much as possible. If drainage is present, place sterile dressing to cover wound and assess daily. If drainage continues to occur after post-operative day 14, call the office for an urgent appointment. (You should be seen in the clinic within 1-2 days of calling). DO NOT REMOVE SUTURES (IF PRESENT) UNDER ANY CIRCUMSTANCES PRIOR TO FOLLOW UP APPOINTMENT.  3) No baths or swimming until otherwise instructed. The wound must remain dry for 10 days after surgery. After 10 days, you may begin to shower only if no drainage is present. No submerging the wound under standing water until cleared by your physician (no baths, hot tubs, swimming pools, etc). Sponge baths are the best way to perform personal hygiene while at the same time protecting the wound from moisture.   4) Prior to showering, the wound must remain dry for 72 consecutive hours (no drainage whatsoever) prior to showering. If the wound drains or spots, the clock \"resets\" - make sure the wound has been drainage-free for 72 consecutive hours.   5) Once you are allowed to get the wound wet, please use gentle soap to wash the wound area. DO NOT aggressively scrub the wound with a washcloth or bath sponge. Please visually inspect your wound(s) at least once daily. If the wound(s) are in a difficult to see location, please use a mirror or have someone else assist with visual inspection.   6) No scrubbing the wound. You may \"pad dry\" the wound, but do not rub, as this may open up the wound and pre-dispose to wound infection.   7) Do not apply lotions or creams to incision site, unless instructed otherwise.   8) Observe for redness,  swelling, or drainage. Please call the clinic immediately if you have fevers, chills with warmth/redness surrounding wound site or if you notice pus drainage from the wound site     Activity   No heavy lifting objects greater than 10 pounds.   No driving while on narcotic pain medication.   No submerging wound under standing water (pool, bath tub, etc.) until otherwise instructed.   You may be protected weightbearing as tolerated on your operative (left lower) extremity   Use crutches or a walker for ambulation.   Wean as appropriate per physical therapist's discretion.   Do not sleep with a pillow behind your knee. You may sleep with a pillow behind your Achilles or foot. This will prevent your knee from getting stiff in the flexed (\"bent\") position and will encourage full extension (leg straightening).   Be vigilant in terms of working on full knee extension and flexion. Your goal should be 0 to 90 degrees by 2 weeks post-op - MINIMUM!   Knee range of motion as tolerated.    Blood Clot Prophylaxis   (Aspirin vs. Lovenox vs. Eliquis administration is determined by your surgeon and tailored to your specific risk profile. You will be discharged with one of these medications.) You will need to complete a total 4 week course of enteric coated aspirin 325 mg (or 81mg) twice daily or Eliquis 2.5mg twice daily, in order to minimize your risk of blood clots following surgery. You will be supplied with a prescription to obtain this. Alternatively, you will need to compete a total 2 week course of Lovenox after surgery (followed by a 2 week course of aspirin twice daily), in order to minimize the risk of blood clots following surgery. Lovenox requires a single shot in the abdomen, to be taken once daily. You will be supplied with the prescription to obtain this. Prior to your discharge from the hospital, the nursing staff will instruct you on self-administration of the Lovenox, if you will be returning directly home from the  hospital.     Discharge Pain Medications   You will be given a prescription for pain medication. You should start taking this the same day after your surgery. Wean off as tolerated. Do not wait to take the pain medication until the pain is severe, as it will be difficult to \"catch up\" once this occurs. The pain medication usually reaches its full effect ~1 hour after ingesting. If you have been sent home on Colace, this medication should be taken until you are off all narcotic (i.e. Oxycodone, etc) pain medications, in order to prevent constipation. If you have been sent home with a combination of oxycodone and Tylenol, please take Tylenol as scheduled.  You must be careful not to exceed 4,000mg (4 grams) of Tylenol. The oxycodone is to be taken as needed for \"breakthrough\" pain.  Some common side effects of the narcotic pain medications include nausea and itching. Benadryl is a great over the counter medication that helps calm your stomach, decreases your anxiety levels, and minimizes the itching. You can easily purchase this at your local pharmacy as an over-the-counter medication. Please abide by the instructions as printed on the bottle. If your nausea persists, make sure to take small amounts of crackers or other lighter foods.     Follow-Up   Follow-up with Dr. Martines's office in 3 weeks from the surgery date for a post-operative evaluation. Have the following xrays done upon arrival to the follow-up appointment: 3 views of operative knee. Please call Dr. Martines's office at [phone] for orthopaedic appointments or questions.          [facility] COLD THERAPY - PATIENT INSTRUCTION SHEET    Cold Compression Therapy for your comfort and rehabilitation  Your caregivers want you to be productive in your rehab and comfortable during your stay. In keeping with those goals, you will be receiving an [facility] Cold Therapy Wrap to help ease post-operative pain and swelling that might keep you from getting back on track! Your  SMI Cold Therapy Wrap is effective and simple-to-use, and you will be encouraged to apply it throughout your hospital stay and at home through the duration of your recovery.    When you are ready to go home  Be sure to take your SMI Cold Therapy Wrap and both sets of Gel Bags with you for continued comfort and use throughout your rehabilitation. If you don't already have them, ask your nurse or aide to retrieve your SMI Gel Bags from the patient freezer.    Home use precautions  Always follow your medical professional's application instructions upon discharge. Your SMI Cold Therapy Wrap and Gel Bags are designed to last for months following your surgery. Never heat the Gel Bags unless specified by your healthcare provider. Supervision is advised when using this product on children or geriatric patients. To avoid danger of suffocation, please keep the outer plastic packaging away from children & pets.    Cold Therapy Instructions  Place Gel Bags in a freezer set ¾ of the way to max temperature for at least (4) hours. For best results, lay the Gel Bags flat and [id number] in the freezer. Once frozen, slide Gel Bags into the gel pouch and secure your wrap to the affected area with the straps.  Gel wraps that have been stored in a freezer for an extended period of time may require a (10) minute period of softening up in a room temperature environment before application.  The gel pouch acts as a protective barrier. NEVER place frozen bags directly onto skin, as this may cause frostbite injury.  The SMI Cold Therapy Wrap is designed to be able to be worm while ambulating. The compression straps can be secured well enough so that the Wrap won't fall off while moving.  Wrap Application Videos can be viewed at [email or web address].  An additional protective barrier such as clothing, a washcloth, hand-towel or pillowcase may be used during prolonged treatment applications.  The Gel-Pouch and Wrap are both Latex-Free and  the Gel Bag ingredients are non toxic.    [facility] Wrap care instructions  The [facility] Cold Therapy Wrap may be hand washed and hung to dry when needed.    [facility] re-order information  Additional [facility] body specific wraps and/or Gel Bags can be re-ordered from [email or web address] or call [phone] ([phone])             InfuBLOCK - Patient Information    What is a pain pump?  InfuBLOCK is a postoperative, non-narcotic pain relief system that delivers local anesthetic to or near the surgical site. This is a pain minimizing therapy that delivers an anesthetic (numbing) medicine to the nerve.    The InfuBLOCK pain pump will continuously deliver a local anesthetic medication to block the pain in the area of your procedure.    Where can I find information about my pain pump?           For more information about your pain pump, scan the QR code.  For additional patient resources, visit [email or web address].                                                                                             The [facility] Nursing Hotline is Here for You 24/7.     Call [phone] for Assistance.  While your physician is your primary source for information about your treatment., there may be times during your treatment that you need assistance with your infusion pump. Our team of compassionate and knowledgeable Registered Nursed (RN) is here to assist every step of the way.    Answers to questions about your infusion pump                 Tubing disconnect  Assistance with pump alarms                                                      Dislodged catheter  Excessive leakage noted from pump                                         Inadequate pain control             Nerve Catheter Removal Instructions  When your device is empty:    Remove your catheter by pulling the dressing off slowly (like you would remove a regular bandage). The catheter should pull right out of the skin.  Check that the BLUE tip is intact.                                                                                      If the catheter is stuck, reposition your   extremity and pull slowly until removed.  *If catheter is HURTING and WON'T come out, stop and call [phone] for further assistance.    Remove medication bag from the black carrying case.  Cut the tubing on right and left side of pump, and discard the medication bag and tubing into garbage.  Place the pump and black carrying case into the plastic bag and then place this into the return box.  Seal box with blue stickers and return to [place] postal service.    THIS IS PRE-PAID POSTAGE.

## 2023-01-04 NOTE — ANESTHESIA PROCEDURE NOTES
Left ACB catheter      Patient reassessed immediately prior to procedure    Patient location during procedure: post-op  Reason for block: at surgeon's request and post-op pain management  Performed by  CRNA/CAA: Annel Blue CRNA  Assisted by: Kelli Ingram RN  Preanesthetic Checklist  Completed: patient identified, IV checked, site marked, risks and benefits discussed, surgical consent, monitors and equipment checked, pre-op evaluation and timeout performed  Prep:  Pt Position: supine  Sterile barriers:cap, gloves, mask, sterile barriers and washed/disinfected hands  Prep: ChloraPrep  Patient monitoring: blood pressure monitoring, continuous pulse oximetry and EKG  Procedure  Performed under: spinal  Guidance:ultrasound guided    ULTRASOUND INTERPRETATION.  Using ultrasound guidance a 20 G gauge needle was placed in close proximity to the nerve, at which point, under ultrasound guidance anesthetic was injected in the area of the nerve and spread of the anesthesia was seen on ultrasound in close proximity thereto.  There were no abnormalities seen on ultrasound; a digital image was taken; and the patient tolerated the procedure with no complications. Images:still images obtained, printed/placed on chart    Laterality:left  Block Type:adductor canal block  Injection Technique:catheter  Needle Type:Tuohy and echogenic  Needle Gauge:18 G  Resistance on Injection: none  Catheter Size:20 G (20g)  Cath Depth at skin: 10 cm    Medications Used: bupivacaine PF (MARCAINE) 0.25 % injection - Injection, Adductor Canal   20 mL - 1/4/2023 9:39:00 AM      Post Assessment  Injection Assessment: negative aspiration for heme, incremental injection and no paresthesia on injection  Patient Tolerance:comfortable throughout block  Complications:no  Additional Notes  CATHETER   A high-frequency linear transducer, with sterile cover, was placed on the anterior mid-thigh (between the anterior superior iliac spine and patella).  The transducer was then moved medially to identify the Sartorius muscle (Marely), Vastus Medialis muscle (VMM), Superficial Femoral Artery (SFA) and Vein. The transducer was then moved cephalad or caudad to position the SFA in the middle of the Marely. The insertion site was prepped and draped in sterile fashion. Skin and cutaneous tissue was infiltrated with 2-5 ml of 1% Lidocaine. Using ultrasound-guidance, an 18-gauge [employer] Ultra 360 Touhy needle was advanced in plane from lateral to medial. Preservative-free normal saline was utilized for hydro-dissection of tissue, advancement of Touhy, and to confirm needle placement below the fascial plane of the Marely where the Nerve to the VMM is located. Local anesthetic (LA) 5 ml deposited here. The Touhy needle continues its path lateral to the SFA at the level of the Saphenous Nerve. The remainder of the LA was deposited at the 10-11 o'clock position of the SFA. This injection created a space between the Marely and the SFA. Aspiration every 5 ml to prevent intravascular injection. Injection was completed with negative aspiration of blood and negative intravascular injection. Injection pressures were normal with minimal resistance. A 20-gauge [employer] Echo catheter was placed through the needle and advance out the tip of the Touhy 3-5 cm anterior to the SFA. The Touhy needle was then removed, and final catheter position verified at the 12 o'clock position to the SFA. The catheter was secured in the usual fashion with skin glue, benzoin, steri-strips, CHG tegaderm and label noting \"Nerve Block Catheter\". Jerk tape applied at yellow connector and catheter connection.

## 2023-01-04 NOTE — OP NOTE
OPERATIVE REPORT     DATE OF PROCEDURE: 1/4/2023    SURGEON: Jesse Martines M.D.     ASSISTANT(S): Circulator: Reji Juarez RN; Juanita Lopez RN  Physician Assistant: Malissa Mora PA  Scrub Person: Cami To  Vendor Representative: Paul Walker  Nursing Assistant: Allyn Pearce-PA was utilized during the case to facilitate positioning the patient, exposure, retraction, placement of final components and definitive closure.    PREOPERATIVE DIAGNOSIS: Advanced degenerative joint disease of the left knee secondary to osteoarthritis    POSTOPERATIVE DIAGNOSIS: same     PROCEDURE: Left total Knee Arthroplasty     SURGICAL DETAILS:     APPROACH: Medial parapatellar     ANESTHESIA: Spinal plus local periarticular block    PREOPERATIVE ANTIBIOTICS: Ancef 2 g IV    TRANEXAMIC ACID: IV    TOURNIQUET TIME: 59 min @300 mmHg     ESTIMATED BLOOD LOSS: 25 cc     SPECIMENS: None    IMPLANTS:   /Brand: [place] [employer]lon  Tibial component size: 4 pressfit tritanium baseplate   Femoral component size: 4 pressfit cruciate retaining   Tibial polyethylene insert: 9 mm cruciate stabilizing   Patellar component: 29 mm asymmetric tritanium  Cement: None    DRAINS: None    LOCAL INJECTION: 1 cc Toradol 30mg/ml, 4 cc duramorph 2mg/ml, 20 cc 0.5% ropivicaine, 20 cc 0.5% lidocaine with 1:200,000 epinephrine, 15 cc preservative free normal saline     MODIFIER(S): None    COMPLICATIONS: None apparent    INDICATIONS FOR PROCEDURE: The patient has a long history of progressive knee pain, arthritis, and degeneration resulting in deformity in the left knee from predominantly medial wear and bone loss. Non-operative treatment and conservative therapeutic measures have been attempted, but have not improved or controlled the symptoms and pain that occurs during normal daily activities. Knee motion has also become limited and is restricting the patient. Total knee arthroplasty was recommended. The risks,  benefits, alternatives, and potential complications of the arthroplasty surgery were discussed with the patient in detail to include but not be limited to infection, bleeding, anesthesia risks, damage to neurovascular structures, osteolysis, aseptic loosening, instability, anterior knee pain, continued pain, iatrogenic fracture, dislocation, need for future surgery including the potential for amputation, blood clots, myocardial infarction, stroke, and death. Specific details of the surgical procedure, hospitalization, recovery, rehabilitation, and long-term precautions were also presented. Pre-operative teaching was provided. Implant/prosthesis selection was outlined, and the many options available were explained; the final choice will be made at the time of the procedure to match the anatomy and condition of the bone, ligaments, tendons, and muscles. The patient completed preoperative medical optimization and risk assessment, joint arthroplasty education, and MRSA decolonization using a universal decolonization protocol. Perioperative blood management and the potential for blood transfusion were discussed with risks and options clearly outlined.     INTRAOPERATIVE FINDINGS: Advanced tricompartmental osteoarthritis with genu varum alignment    PROCEDURE: The patient was identified in the preoperative holding area. The operative site was confirmed and marked. A sequential compression device was placed on the nonoperative leg. The risks, benefits, and alternatives to surgery were again confirmed with the patient and the patient wished to proceed. The patient was brought to the operating room and placed on the operating room table in the supine position. All bony prominences were padded. A huddle was performed with the patient and all vital surgical team members to confirm the correct operative site, procedure, anesthesia type, and operative plan with the patient. After anesthesia was performed, a tourniquet was  applied to the upper thigh of the operative leg. A full knee exam was performed once anesthesia was in full effect. Intravenous antibiotic prophylaxis was given and confirmed with the anesthesia team.     The operative leg was prepped and draped in the usual sterile fashion. A surgical time out was performed immediately preceding the incision with all personnel in the operating room to confirm patient identity, the correct operative site and extremity, correct radiographic studies, availability of appropriate surgical equipment and agreement on the planned procedure. The operative knee was elevated and exsanguinated using an esmarch and the tourniquet was inflated. The knee was exposed using a limited anterior-midline skin incision. Dissection was carried down through skin and subcutaneous tissue to the extensor mechanism with a scalpel. A medial parapatellar arthrotomy was made to enter the knee space sharply. A large amount of normal appearing joint fluid was encountered and suctioned. The synovium was thickened, hypertrophic, and inflamed. A partial synovectomy was performed for exposure, and the medial and lateral gutters were cleared of scar and synovial reflections. The superficial medial collateral ligament was carefully elevated off osteophytes which were then removed with a rongeur and osteotome. Degenerative meniscal remnants were excised medially and laterally. The patellar synovial reflections were released and the patella exposed to reveal complete wear through the articular surface. The trochlea demonstrated similar severe wear. The patella was then subluxed laterally. The knee was then flexed up to 90 degrees.     Assessment of the knee joint revealed severe end-stage articular damage with no remaining medial weight bearing cartilage. The medial compartment was severely eburnated with bone loss on the medial tibia and medial femoral condyle, resulting in the varus deformity. Osteophytes were removed  from the notch. The ACL was resected. Osteophytes were then further debrided from the femur and the tibia.     Attention was then turned to the femur. The medullary cavity of the femur was entered anterior-medial to the intra-condylar notch above the PCL with a 5/6th inch step drill. The femoral canal was over-reamed, irrigated, and suctioned to prevent fat embolization. An intramedullary alignment system was then placed, fixed to the femur with pins, and the distal femoral osteotomy was made in 5 degrees of valgus with an 8 mm resection. Attention was then turned to the tibia. Retractors were placed medially and laterally to protect the collateral ligaments and a Skip retractor was placed around the PCL in the intra-condylar notch. The tibia was then subluxed anteriorly for wide exposure. An extramedullary alignment system was then placed and the proximal tibial osteotomy was made measuring 1-2 mm off the most affected side and 9-10 mm off the unaffected side with approximately 3 degrees posterior slope. The guide was also confirmed to be perpendicular to the tibial axis prior to the osteotomy. A sizing guide was then used to select the tibial component size. The knee was then brought to extension and the appropriate sized spacer block was placed. This brought the knee to full extension with excellent medial and lateral balance.     The flexion gap was then inspected and measured both visually and with a tensioner device to assess the medial and lateral flexion balance. A femur posterior reference guide was placed in 6 degrees of external rotation in accordance with the soft tissue balance. This resulted in symmetric flexion and extension gaps and was verified against the epicondylar axis and Wright's line. The femur was sized using a posterior referencing guide and, using the previously determined degrees of rotation, drill holes were made for the cutting block. The 4 in 1 cutting block was impacted into  place and secured. Cuts were completed using a saw with the collaterals protected by Z-retractors. Care was taken to preserve the PCL. A lamina  was placed with the knee in 90 degrees of flexion and a large curved osteotome, rongeur, and curettes were utilized to clear posterior osteophytes, loose bodies and meniscal remnants.     A femoral trial implant was placed; excellent fit was confirmed. The medial-lateral and anterior-posterior dimensions were checked; anatomic fit and coverage were achieved.The proximal tibia baseplate trial was placed with its mid-point at the junction of medial one-third and lateral two-thirds of the tibial tubercle and pinned to this fixed position. A trial reduction was then performed. Trial reduction demonstrated the knee achieved full extension with excellent stability and range of motion, and no tendency toward instability with varus-valgus stress at full extension, mid-flexion, or 90 degrees of flexion. The PCL was also found to be appropriately tensioned with normal posterior tibial excursion.     Next, attention was turned to the patella. It was measured and the posterior 9-10 mm was resected leaving a healthy remnant with greater than 11mm thickness. The patella was sized with the asymmetric guide, and drill holes were made. A trial button was placed and tracking of the patella and the entire knee trial was tested. The patella tracking was excellent throughout range of motion with no instability. Punches and drills were then placed through the trials to accommodate the final implants. All trials were removed.     The wound was copiously lavaged with a pulse irrigation/suction system. The posterior recess of the knee and areas of known bleeding were treated with the electrocautery to reduce post-operative bleeding. A pain cocktail was injected into the shahbaz-articular tissues. The cut bone surfaces were then irrigated again, suctioned, and dried. The final implants were  impacted into place, tibia followed by tibial polyethylene followed by femur followed by patella. The tourniquet was released and no excessive bleeding was encountered. Synovial bleeding was further treated with the electrocautery until adequate hemostasis was obtained.     The wound was again irrigated with dilute betadine solution followed by saline. The extensor mechanism and capsule was then anatomically closed with interrupted #1 Vicryl suture and a running #2 Stratafix stitch. Knee stability and range of motion with the capsule closed was excellent, and range of motion was 0 to 135 degrees without excessive stress on the repair. Instrument and sponge count was completed and confirmed correct. Deep and superficial subcutaneous tissue was closed with interrupted 2-0 Vicryl suture. A running 3-0 Monocryl subcuticular stitch was used to re-approximate the skin edges followed by skin glue adhesive to seal the wound. A silver impregnated dressing was then placed, followed by a sequential compression device to the operative limb. The patient was sufficiently recovered from anesthesia, transferred to a hospital bed and taken to the PACU in stable condition.     One gram (1000 mg) of intravenous tranexamic acid was administered prior to incision. A second one gram (1000 mg) intravenous dose was given prior to wound closure.    No apparent complications occurred during the procedure. Instrument, sponge and needle counts were correct x 2.     The patient underwent risk stratification preoperatively and aspirin was chosen for DVT prophylaxis. Delay in starting chemical prophylaxis for 23 hours from surgical incision was over concerns for hematoma formation and wound related issues.     POST OPERATIVE PLAN:   Weight bearing as tolerated with knee range of motion as tolerated   Pain control with PO/IV meds   Adductor canal catheter placement by Anesthesia Pain Management Team in PACU.   23 hours perioperative antibiotic  prophylaxis   PT/OT for mobilization and medical equipment needs   Keep silver dressing in place for 7 days post op. Change dressing only if saturated.   SCDs to bilateral lower extremities   Social work for discharge planning needs   Follow up in 3 weeks for post operative wound check with XR AP and lateral of operative knee.

## 2023-01-04 NOTE — SIGNIFICANT NOTE
Pt sent without pain meds. Dr. Martines to call in to her home pharmacy between cases... per Sona Browne.

## 2023-01-04 NOTE — PLAN OF CARE
Goal Outcome Evaluation:  Plan of Care Reviewed With: patient        Progress: no change  Outcome Evaluation: PT eval complete. Pt performed bed mobility with SBA. Pt performed STS and amb 400' with FWW and CGAx2. Pt navigated 5 steps with SPC and CGAx2. No knee buckling or LOB noted. Activity limited by fatigue. HEP and precautions reviewed with pt. L knee ROM 14-80. Recommend d/c home with assist and OPPT. Pt cleared to d/c today from PT standpoint.

## 2023-01-04 NOTE — CASE MANAGEMENT/SOCIAL WORK
Case Management Discharge Note      Final Note: I spoke with patient in regards to discharge planning. She resides with her spouse and college aged children on a single level home with 2 steps to enter. SHe has a new toilet riser being placed today. She has received a rolling walker in her room provided by [employer].   She denies the need for other DME. She would like to use [facility] in [place]. Referral has been given to Natalie with [employer] and she will reach out to her. Patient has [employer] [employer] and voices no concerns of coverage or benefits. She has no advanced directives.         Selected Continued Care - Admitted Since 1/4/2023     Destination    No services have been selected for the patient.              Durable Medical Equipment Coordination complete.    Service Provider Selected Services Address Phone Fax Patient Preferred    [facility] [address] [facility] [address] [phone] --          Dialysis/Infusion    No services have been selected for the patient.              Home Medical Care    No services have been selected for the patient.              Therapy Coordination complete.    Service Provider Selected Services Address Phone Fax Patient Preferred    [facility] [address] [facility] [address] [phone] --          Community Resources    No services have been selected for the patient.              Community & DME    No services have been selected for the patient.                       Final Discharge Disposition Code: 01 - home or self-care

## 2023-01-04 NOTE — H&P
[facility] PREOPERATIVE HISTORY AND PHYSICAL     Chief complaint:  Left Knee Pain    Subjective:  Patient is a 52 y.o.female presents with progressive worsening of chronic left knee pain.  Rates her pain 6/10 on the pain scale.  She is having recurrent swelling and stiffness and pain is worse with walking weightbearing activities. The patient has clinical and radiographic evidence of end-stage left knee joint degeneration. Conservative measures have been tried for 3 months or longer, but have failed to adequately treat or improve the patient's symptoms. Pain is restricting the patient's daily activities as well as quality of life. See office note dated 9/20/2023.    The patient is here today for scheduled and consented LEFT TOTAL KNEE ARTHROPLASTY.    Associated Documents:   Office Visit with Jesse Martines MD (09/20/2022)      Review of Systems:  General ROS: negative for fever, chills, weakness, dizziness, headache, fatigue, weight changes  Cardiovascular ROS: no chest pain or dyspnea on exertion  Respiratory ROS: no cough, shortness of breath, or wheezing  GI ROS: no abdominal pain/discomfort, nausea, vomiting or diarrhea   ROS: no dysuria, hematuria or complaints  Skin ROS: no itching, rash or open wounds.      Allergies:   Allergies   Allergen Reactions   • Codeine Nausea And Vomiting     As a child per pt    Latex: no known allergy  Contrast Dye:  no known allergy      Home Meds    Medications Prior to Admission   Medication Sig Dispense Refill Last Dose   • Chlorhexidine Gluconate 4 % solution Apply  topically to the appropriate area as directed Daily As Needed for Wound Care. Shower daily with hibiclens solution as directed 5 days prior to surgery. 237 mL 0 1/3/2023   • mupirocin (BACTROBAN) 2 % ointment into the nostril(s) as directed by provider 2 (Two) Times a Day. Apply pea-sized amount to each nostril twice daily for 5 days prior to surgery (Patient taking differently: 1  application into the nostril(s) as directed by provider 2 (Two) Times a Day.) 22 g 0 1/3/2023   • naproxen (NAPROSYN) 500 MG tablet Take 1 tablet by mouth 2 (Two) Times a Day With Meals. 60 tablet 3 Past Week     PMH:   Past Medical History:   Diagnosis Date   • COVID 01/2022    mild- cough and joint pain only   • Osteoarthritis     LEFT KNEE   • PONV (postoperative nausea and vomiting)      PSH:    Past Surgical History:   Procedure Laterality Date   • BREAST SURGERY  2017    reduction   • HYSTERECTOMY  2008   • KNEE SURGERY Left     torn meniscus, patellar dislocation    • TONSILLECTOMY  1974   • TUBAL ABDOMINAL LIGATION  2002   • WISDOM TOOTH EXTRACTION       Immunization History   Administered Date(s) Administered   • COVID-19 (PFIZER) PURPLE CAP 05/09/2021, 05/30/2021, 12/06/2021   • Influenza, Unspecified 12/06/2021   • Tdap 01/01/2016       Social History:  Social History     Tobacco Use   • Smoking status: Never   • Smokeless tobacco: Never   Substance Use Topics   • Alcohol use: Yes     Alcohol/week: 4.0 standard drinks     Types: 4 Glasses of wine per week     Comment: 4 GLASSES/WEEK           Physical Exam:/93 (BP Location: Right arm, Patient Position: Lying)   Pulse 81   Temp 98.6 °F (37 °C) (Temporal)   Resp 18   Ht 160 cm (62.99\")   Wt 98.9 kg (218 lb 0.6 oz)   SpO2 98%   BMI 38.63 kg/m²       General Appearance:    Alert, cooperative, no distress, appears stated age   Head:    Normocephalic, without obvious abnormality, atraumatic   Lungs:     Clear to auscultation bilaterally, respirations unlabored    Heart: Regular rate and rhythm, S1 and S2 normal, no murmur, rub    or gallop    Abdomen:    Soft without tenderness  +bowel sounds   Breast Exam:    deferred   Genitalia:    deferred   Extremities:   Extremities normal, atraumatic, no cyanosis or edema   Skin:   Skin color, texture, turgor normal, no rashes or lesions   Neurologic:   Grossly intact     Results Review:   LABS:  Lab Results    Component Value Date    WBC 8.16 12/22/2022    HGB 13.2 12/22/2022    HCT 39.2 12/22/2022    MCV 85.4 12/22/2022     12/22/2022    NEUTROABS 4.26 12/22/2022    GLUCOSE 109 (H) 12/22/2022    BUN 12 12/22/2022    CREATININE 0.86 12/22/2022     12/22/2022    K 3.9 12/22/2022     12/22/2022    CO2 25.0 12/22/2022    CALCIUM 9.3 12/22/2022    ALBUMIN 4.40 12/22/2022    AST 16 12/22/2022    ALT 11 12/22/2022    BILITOT 0.5 12/22/2022       RADIOLOGY:  Imaging Results (Last 72 Hours)     ** No results found for the last 72 hours. **           Cancer Staging (if applicable):  Cancer Patient: __ yes __no __unknown; If yes, clinical stage T:__ N:__M:__, stage group    Impression:  PRIMARY OSTEOARTHRITIS OF LEFT KNEE    Plan:  TOTAL KNEE ARTHROPLASTY - LEFT      Malissa REAL Woodruff 1/4/2023 06:51 EST

## 2023-01-04 NOTE — ANESTHESIA PREPROCEDURE EVALUATION
Anesthesia Evaluation     history of anesthetic complications: PONV               Airway   Mallampati: I  TM distance: >3 FB  Neck ROM: full  Dental      Pulmonary    Cardiovascular     ECG reviewed        Neuro/Psych  GI/Hepatic/Renal/Endo      Musculoskeletal     Abdominal    Substance History      OB/GYN          Other   arthritis,                      Anesthesia Plan    ASA 2     spinal     (acb  Left  Site marked)  intravenous induction     Anesthetic plan, risks, benefits, and alternatives have been provided, discussed and informed consent has been obtained with: patient.    Plan discussed with CRNA.        CODE STATUS:

## 2023-01-04 NOTE — BRIEF OP NOTE
TOTAL KNEE ARTHROPLASTY  Progress Note    Елена Sellers  1/4/2023    Pre-op Diagnosis:   Primary osteoarthritis of left knee [M17.12]       Post-Op Diagnosis Codes:     * Primary osteoarthritis of left knee [M17.12]    Procedure/CPT® Codes:  ME ARTHRP KNE CONDYLE&PLATU MEDIAL&LAT COMPARTMENTS [87076]      Procedure(s):  TOTAL KNEE ARTHROPLASTY - LEFT    Surgical Approach: Knee Medial Parapatellar      Surgeon(s):  Jesse Martines MD    Anesthesia: Spinal    Staff:   Circulator: Reji Juarez RN; Juanita Lopez RN  Physician Assistant: Malissa Mora PA  Scrub Person: Cami To  Vendor Representative: Paul Walker  Nursing Assistant: Allyn Pearce         Estimated Blood Loss: 100 mL    Urine Voided: * No values recorded between 1/4/2023  7:18 AM and 1/4/2023  9:17 AM *    Specimens:                None          Drains: * No LDAs found *    Findings: Advanced tricompartmental osteoarthritis with genu varum alignment        Complications: None apparent          Jesse Martines MD     Date: 1/4/2023  Time: 09:17 EST

## 2023-01-04 NOTE — THERAPY EVALUATION
Patient Name: Елена Sellers  : 1970    MRN: [id number]                              Today's Date: 2023       Admit Date: 2023    Visit Dx:     ICD-10-CM ICD-9-CM   1. Status post total left knee replacement  Z96.652 V43.65   2. Primary osteoarthritis of left knee  M17.12 715.16     Patient Active Problem List   Diagnosis   • Primary osteoarthritis of left knee   • Status post total left knee replacement     Past Medical History:   Diagnosis Date   • COVID 2022    mild- cough and joint pain only   • Osteoarthritis     LEFT KNEE   • PONV (postoperative nausea and vomiting)      Past Surgical History:   Procedure Laterality Date   • BREAST SURGERY  2017    reduction   • HYSTERECTOMY     • KNEE SURGERY Left     torn meniscus, patellar dislocation    • TONSILLECTOMY     • TUBAL ABDOMINAL LIGATION     • WISDOM TOOTH EXTRACTION        General Information     Row Name 23 1320          Physical Therapy Time and Intention    Document Type evaluation  -     Mode of Treatment physical therapy  -     Row Name 23 1320          General Information    Patient Profile Reviewed yes  -HP     Prior Level of Function min assist:;all household mobility;community mobility;gait;transfer;bed mobility;ADL's  -     Existing Precautions/Restrictions fall;other (see comments)  adductor canal nerve cath  -     Barriers to Rehab none identified  -     Row Name 23 1320          Living Environment    People in Home spouse  -     Row Name 23 1320          Home Main Entrance    Number of Stairs, Main Entrance two  -HP     Stair Railings, Main Entrance none  -     Row Name 23 1320          Stairs Within Home, Primary    Number of Stairs, Within Home, Primary none  -     Row Name 23 1320          Cognition    Orientation Status (Cognition) oriented x 3  -HP     Row Name 23 1320          Safety Issues, Functional Mobility    Safety Issues Affecting Function  (Mobility) insight into deficits/self-awareness;awareness of need for assistance;safety precaution awareness  -HP     Impairments Affecting Function (Mobility) balance;pain;strength;endurance/activity tolerance;sensation/sensory awareness;range of motion (ROM)  -           User Key  (r) = Recorded By, (t) = Taken By, (c) = Cosigned By    Initials Name Provider Type    HP Karen Mendoza, PT Physical Therapist               Mobility     Row Name 01/04/23 1320          Bed Mobility    Comment, (Bed Mobility) pt UIC  -HP     Row Name 01/04/23 1320          Transfers    Comment, (Transfers) VC for hand placement  -HP     Row Name 01/04/23 1320          Sit-Stand Transfer    Sit-Stand [place] (Transfers) contact guard;2 person assist  -HP     Assistive Device (Sit-Stand Transfers) walker, front-wheeled  -HP     Row Name 01/04/23 1320          Gait/Stairs (Locomotion)    [place] Level (Gait) contact guard;2 person assist  -HP     Assistive Device (Gait) walker, front-wheeled  -HP     Ambulated day of surgery or within 4 hours of PACU discharge yes  -HP     Distance in Feet (Gait) 400  -HP     Deviations/Abnormal Patterns (Gait) bilateral deviations;weight shifting decreased;stride length decreased;base of support, wide  -HP     Bilateral Gait Deviations forward flexed posture  -HP     [place] Level (Stairs) contact guard;2 person assist  -HP     Assistive Device (Stairs) cane, straight  -HP     Number of Steps (Stairs) 5  -HP     Ascending Technique (Stairs) step-to-step  -HP     Descending Technique (Stairs) step-to-step  -HP     Comment, (Gait/Stairs) Pt amb 400' with FWW and CGAx2. Step-through gait pattern with VC for upright posture and incresaed stride length. No LOB or knee buckling noted. Activity limited by fatigue.  -HP     Row Name 01/04/23 1320          Mobility    Extremity Weight-bearing Status left lower extremity  -HP     Left Lower Extremity (Weight-bearing Status) weight-bearing as  tolerated (WBAT)  -           User Key  (r) = Recorded By, (t) = Taken By, (c) = Cosigned By    Initials Name Provider Type     Karen Mendoza PT Physical Therapist               Obj/Interventions     [facility] Name 01/04/23 1358          Range of Motion Comprehensive    General Range of Motion lower extremity range of motion deficits identified  -     Comment, General Range of Motion L knee ORM 14-70  -[facility] Name 01/04/23 [facility]          Strength Comprehensive (MMT)    General Manual Muscle Testing (MMT) Assessment lower extremity strength deficits identified  -     Comment, General Manual Muscle Testing (MMT) Assessment Pt able to perform B active ankle and SLR  -     Row Name 01/04/23 1358          Motor Skills    Therapeutic Exercise knee;ankle  -[facility] Name 01/04/23 135          Knee (Therapeutic Exercise)    Knee (Therapeutic Exercise) strengthening exercise;isometric exercises  -     Knee Isometrics (Therapeutic Exercise) bilateral;quad sets;10 repetitions  -     Knee Strengthening (Therapeutic Exercise) left;SLR (straight leg raise);LAQ (long arc quad);heel slides;5 repetitions  -[facility] Name 01/04/23 [facility]          Ankle (Therapeutic Exercise)    Ankle (Therapeutic Exercise) AROM (active range of motion)  -     Ankle AROM (Therapeutic Exercise) bilateral;dorsiflexion;plantarflexion;10 repetitions  -[facility] Name 01/04/23 1359          Balance    Balance Assessment sitting static balance;sitting dynamic balance;sit to stand dynamic balance;standing static balance;standing dynamic balance  -     Static Sitting Balance standby assist  -     Dynamic Sitting Balance standby assist  -     Position, Sitting Balance sitting edge of bed  -     Static Standing Balance contact guard  -     Dynamic Standing Balance contact guard  -     Position/Device Used, Standing Balance supported;walker, rolling  -     Balance Interventions sitting;standing;sit to stand;occupation  based/functional task  -     Row Name 01/04/23 1358          Sensory Assessment (Somatosensory)    Sensory Assessment (Somatosensory) LE sensation intact  -           User Key  (r) = Recorded By, (t) = Taken By, (c) = Cosigned By    Initials Name Provider Type    HP Karen Mendoza, PT Physical Therapist               Goals/Plan     Row Name 01/04/23 1402          Bed Mobility Goal 1 (PT)    Activity/Assistive Device (Bed Mobility Goal 1, PT) sit to supine/supine to sit  -HP     [place] Level/Cues Needed (Bed Mobility Goal 1, PT) modified independence  -HP     Time Frame (Bed Mobility Goal 1, PT) long term goal (LTG);3 days  -HP     Progress/Outcomes (Bed Mobility Goal 1, PT) goal ongoing  -     Row Name 01/04/23 1402          Transfer Goal 1 (PT)    Activity/Assistive Device (Transfer Goal 1, PT) sit-to-stand/stand-to-sit  -HP     [place] Level/Cues Needed (Transfer Goal 1, PT) modified independence  -HP     Time Frame (Transfer Goal 1, PT) long term goal (LTG);3 days  -HP     Progress/Outcome (Transfer Goal 1, PT) goal ongoing  -     Row Name 01/04/23 1402          Gait Training Goal 1 (PT)    Activity/Assistive Device (Gait Training Goal 1, PT) gait (walking locomotion)  -HP     [place] Level (Gait Training Goal 1, PT) modified independence  -HP     Distance (Gait Training Goal 1, PT) 500  -HP     Time Frame (Gait Training Goal 1, PT) long term goal (LTG);3 days  -HP     Progress/Outcome (Gait Training Goal 1, PT) goal ongoing  -     Row Name 01/04/23 1402          ROM Goal 1 (PT)    ROM Goal 1 (PT) L knee ROM 0-90  -HP     Time Frame (ROM Goal 1, PT) long-term goal (LTG);3 days  -HP     Progress/Outcome (ROM Goal 1, PT) goal ongoing  -     Row Name 01/04/23 1402          Stairs Goal 1 (PT)    Activity/Assistive Device (Stairs Goal 1, PT) ascending stairs;descending stairs  -HP     [place] Level/Cues Needed (Stairs Goal 1, PT) modified independence  -HP     Number of Stairs (Stairs  Goal 1, PT) 2  -HP     Time Frame (Stairs Goal 1, PT) long term goal (LTG);3 days  -HP     Progress/Outcome (Stairs Goal 1, PT) goal ongoing  -     Row Name 01/04/23 1402          Therapy Assessment/Plan (PT)    Planned Therapy Interventions (PT) balance training;bed mobility training;gait training;home exercise program;patient/family education;transfer training;stretching;strengthening;stair training;ROM (range of motion)  -           User Key  (r) = Recorded By, (t) = Taken By, (c) = Cosigned By    Initials Name Provider Type     Karen Mendoza, PT Physical Therapist               Clinical Impression     Row Name 01/04/23 1400          Pain    Pretreatment Pain Rating 3/10  -HP     Posttreatment Pain Rating 3/10  -     Pain Location - Side/Orientation Left  -     Pain Location anterior  -     Pain Location - knee  -HP     Pain Intervention(s) Repositioned;Cold applied;Ambulation/increased activity;Elevated  -     Row Name 01/04/23 1400          Plan of Care Review    Plan of Care Reviewed With patient  -HP     Progress no change  -HP     Outcome Evaluation PT eval complete. Pt performed bed mobility with SBA. Pt performed STS and amb 400' with FWW and CGAx2. Pt navigated 5 steps with SPC and CGAx2. No knee buckling or LOB noted. Activity limited by fatigue. HEP and precautions reviewed with pt. L knee ROM 14-80. Recommend d/c home with assist and OPPT. Pt cleared to d/c today from PT standpoint.  -     Row Name 01/04/23 1400          Therapy Assessment/Plan (PT)    Rehab Potential (PT) good, to achieve stated therapy goals  -     Criteria for Skilled Interventions Met (PT) yes;meets criteria;skilled treatment is necessary  -     Therapy Frequency (PT) 2 times/day  -     Row Name 01/04/23 1400          Vital Signs    Pre Systolic BP Rehab --  VSS  -HP     Pre Patient Position Supine  -HP     Intra Patient Position Standing  -HP     Post Patient Position Sitting  -     Row Name 01/04/23 1400           Positioning and Restraints    Pre-Treatment Position in bed  -HP     Post Treatment Position chair  -HP     In Chair notified nsg;reclined;sitting;call light within reach;encouraged to call for assist;exit alarm on;with family/caregiver;legs elevated;waffle cushion  -           User Key  (r) = Recorded By, (t) = Taken By, (c) = Cosigned By    Initials Name Provider Type     Karen Mendoza PT Physical Therapist               Outcome Measures     Row Name 01/04/23 1403          How much help from another person do you currently need...    Turning from your back to your side while in flat bed without using bedrails? 4  -HP     Moving from lying on back to sitting on the side of a flat bed without bedrails? 4  -HP     Moving to and from a bed to a chair (including a wheelchair)? 3  -HP     Standing up from a chair using your arms (e.g., wheelchair, bedside chair)? 3  -HP     Climbing 3-5 steps with a railing? 3  -HP     To walk in hospital room? 3  -HP     AM-PAC 6 Clicks Score (PT) 20  -HP     Highest level of mobility 6 --> Walked 10 steps or more  -     Row Name 01/04/23 1403          PADD    Diagnosis 1  -     Gender 1  -     Age Group 2  -HP     Gait Distance 1  -HP     Assist Level 1  -HP     Home Support 3  -HP     PADD Score 9  -HP     Patient Preference home with outpatient rehab  -     Prediction by PADD Score directly home (with home health or out-patient rehab)  -     Row Name 01/04/23 1403          Functional Assessment    Outcome Measure Options AM-PAC 6 Clicks Basic Mobility (PT);PADD  -           User Key  (r) = Recorded By, (t) = Taken By, (c) = Cosigned By    Initials Name Provider Type    Karen Perera PT Physical Therapist                             Physical Therapy Education     Title: PT OT SLP Therapies (Done)     Topic: Physical Therapy (Done)     Point: Mobility training (Done)     Learning Progress Summary           Patient Acceptance, E,FERNANDO, ZBIGNIEW,DU by  at  1/4/2023 1404                   Point: Home exercise program (Done)     Learning Progress Summary           Patient Acceptance, E,D, VU,DU by  at 1/4/2023 1404                   Point: Body mechanics (Done)     Learning Progress Summary           Patient Acceptance, E,D, VU,DU by  at 1/4/2023 1404                   Point: Precautions (Done)     Learning Progress Summary           Patient Acceptance, E,D, VU,DU by  at 1/4/2023 1404                               User Key     Initials Effective Dates Name Provider Type [facility] 06/01/21 -  Karen Mendoza, PT Physical Therapist PT              PT Recommendation and Plan  Planned Therapy Interventions (PT): balance training, bed mobility training, gait training, home exercise program, patient/family education, transfer training, stretching, strengthening, stair training, ROM (range of motion)  Plan of Care Reviewed With: patient  Progress: no change  Outcome Evaluation: PT eval complete. Pt performed bed mobility with SBA. Pt performed STS and amb 400' with FWW and CGAx2. Pt navigated 5 steps with SPC and CGAx2. No knee buckling or LOB noted. Activity limited by fatigue. HEP and precautions reviewed with pt. L knee ROM 14-80. Recommend d/c home with assist and OPPT. Pt cleared to d/c today from PT standpoint.     Time Calculation:    PT Charges     Row Name 01/04/23 1100             Time Calculation    Start Time 1100  -HP      PT Received On 01/04/23  -      PT Goal Re-Cert Due Date 01/14/23  -HP         Timed Charges    70863 - PT Therapeutic Exercise Minutes 10  -HP         Untimed Charges    PT Eval/Re-eval Minutes 50  -HP         Total Minutes    Timed Charges Total Minutes 10  -HP      Untimed Charges Total Minutes 50  -HP       Total Minutes 60  -HP            User Key  (r) = Recorded By, (t) = Taken By, (c) = Cosigned By    Initials Name Provider Type     Karen Mendoza, PT Physical Therapist              Therapy Charges for Today     Code  Description Service Date Service Provider Modifiers Qty    12819115948 HC PT THER PROC EA 15 MIN 1/4/2023 Karen Mendoza, PT GP 1    98900719359 HC PT EVAL LOW COMPLEXITY 4 1/4/2023 Karen Mendoza, PT GP 1    21420160202 HC PT THER SUPP EA 15 MIN 1/4/2023 Karen Mendoza, PT GP 3          PT G-Codes  Outcome Measure Options: AM-PAC 6 Clicks Basic Mobility (PT), PADD  AM-PAC 6 Clicks Score (PT): 20  PT Discharge Summary  Anticipated Discharge Disposition (PT): home with assist, home with outpatient therapy services    Karen Mendoza, PT  1/4/2023

## 2023-01-04 NOTE — ANESTHESIA POSTPROCEDURE EVALUATION
Patient: Елена Sellers    Procedure Summary     Date: 01/04/23 Room / Location:  [facility]  [facility]    Anesthesia Start: 0718 Anesthesia Stop:     Procedure: TOTAL KNEE ARTHROPLASTY - LEFT (Left: Knee) Diagnosis:       Primary osteoarthritis of left knee      (Primary osteoarthritis of left knee [M17.12])    Surgeons: Jesse Martines MD Provider: Jg Mott MD    Anesthesia Type: spinal ASA Status: 2          Anesthesia Type: spinal    Vitals  Vitals Value Taken Time   /71 01/04/23 0935   Temp 98 °F (36.7 °C) 01/04/23 0934   Pulse 97 01/04/23 0937   Resp 14 01/04/23 0934   SpO2 96 % 01/04/23 0937   Vitals shown include unvalidated device data.        Post Anesthesia Care and Evaluation    Patient location during evaluation: PACU  Patient participation: complete - patient cannot participate  Level of consciousness: responsive to physical stimuli  Pain score: 0  Pain management: adequate    Airway patency: patent  Anesthetic complications: No anesthetic complications    Cardiovascular status: stable  Respiratory status: acceptable, nasal cannula, oral airway and spontaneous ventilation  Hydration status: stable    Comments: Pt transferred to PACU with O2. Vital signs stable. Report to PACU RN and care accepted.

## 2023-01-04 NOTE — H&P
Patient Name: Елена Sellers  MRN: [id number]  : 1970  DOS: 2023    Attending: Jesse Martines MD    Primary Care Provider: Nisreen Rosas APRN      Chief complaint: Left knee pain    Subjective   Patient is a pleasant 52 y.o. female presented for scheduled surgery by Dr. Martines.  She underwent left total knee arthroplasty under spinal anesthesia.  She tolerated surgery well and was admitted for further medical management.  Her knee has been painful for several years.  She denies use of assistive device for ambulation or recent falls.  She tried cortisone injections with short-term benefit.    When seen postop she is doing well.  Her pain is well controlled.  She denies nausea, shortness of breath or chest pain.  No history of DVT or PE.    Allergies:  Allergies   Allergen Reactions   • Codeine Nausea And Vomiting     As a child per pt        Meds:  Medications Prior to Admission   Medication Sig Dispense Refill Last Dose   • naproxen (NAPROSYN) 500 MG tablet Take 1 tablet by mouth 2 (Two) Times a Day With Meals. 60 tablet 3 Past Week         History:   Past Medical History:   Diagnosis Date   • COVID 2022    mild- cough and joint pain only   • Osteoarthritis     LEFT KNEE   • PONV (postoperative nausea and vomiting)      Past Surgical History:   Procedure Laterality Date   • BREAST SURGERY  2017    reduction   • HYSTERECTOMY     • KNEE SURGERY Left     torn meniscus, patellar dislocation    • TONSILLECTOMY     • TUBAL ABDOMINAL LIGATION     • WISDOM TOOTH EXTRACTION       Family History   Problem Relation Age of Onset   • Arthritis Mother    • COPD Mother    • Thyroid disease Mother    • Obesity Mother    • Hypertension Mother    • Breast cancer Mother         bilat mastectomy   • Obesity Father      Social History     Tobacco Use   • Smoking status: Never   • Smokeless tobacco: Never   Vaping Use   • Vaping Use: Never used   Substance Use Topics   • Alcohol use: Yes      Alcohol/week: 4.0 standard drinks     Types: 4 Glasses of wine per week     Comment: 4 GLASSES/WEEK   • Drug use: Never   She is  with 3 children.  She works from [employer].    Review of Systems  Pertinent items are noted in HPI, all other systems reviewed and negative    Vital Signs  /87 (BP Location: Left arm, Patient Position: Lying)   Pulse 82   Temp 98 °F (36.7 °C) (Oral)   Resp 16   Ht 160 cm (62.99\")   Wt 98.9 kg (218 lb 0.6 oz)   SpO2 98%   BMI 38.63 kg/m²     Physical Exam:    General Appearance:    Alert, cooperative, in no acute distress   Head:    Normocephalic, without obvious abnormality, atraumatic   Eyes:            Lids and lashes normal, conjunctivae and sclerae normal, no   icterus, no pallor, corneas clear,    Ears:    Ears appear intact with no abnormalities noted   Throat:   No oral lesions, no thrush, oral mucosa moist   Neck:   No adenopathy, supple, trachea midline, no thyromegaly    Lungs:     Clear to auscultation,respirations regular, even and unlabored    Heart:    Regular rhythm and normal rate, normal S1 and S2, no murmur, no gallop   Abdomen:     Normal bowel sounds, no masses, no organomegaly, soft non-tender, non-distended, no guarding, no rebound  tenderness   Genitalia:    Deferred   Extremities:  Left knee Ace wrap CDI.  Nerve block present.   Pulses:   Pulses palpable and equal bilaterally   Skin:   No bleeding, bruising or rash   Neurologic:   Cranial nerves 2 - 12 grossly intact. Flexion and dorsiflexion intact bilateral feet.        I reviewed the patient's new clinical results.     Lab Results   Component Value Date    HGBA1C 5.00 12/22/2022      Latest Reference Range & Units 12/22/22 14:11   Glucose 65 - 99 mg/dL 109 (H)   Sodium 136 - 145 mmol/L 141   Potassium 3.5 - 5.2 mmol/L 3.9   CO2 22.0 - 29.0 mmol/L 25.0   Chloride 98 - 107 mmol/L 106   Anion Gap 5.0 - 15.0 mmol/L 10.0   Creatinine 0.57 - 1.00 mg/dL 0.86   BUN 6 - 20 mg/dL 12    BUN/Creatinine Ratio 7.0 - 25.0  14.0   Calcium 8.6 - 10.5 mg/dL 9.3   eGFR >60.0 mL/min/1.73 81.9   Alkaline Phosphatase 39 - 117 U/L 64   Total Protein 6.0 - 8.5 g/dL 7.2   ALT (SGPT) 1 - 33 U/L 11   AST (SGOT) 1 - 32 U/L 16   Total Bilirubin 0.0 - 1.2 mg/dL 0.5   Albumin 3.50 - 5.20 g/dL 4.40   Globulin gm/dL 2.8   A/G Ratio g/dL 1.6   Hemoglobin A1C 4.80 - 5.60 % 5.00   Protime 11.4 - 14.4 Seconds 12.6   INR 0.84 - 1.13  0.96   PTT 22.0 - 39.0 seconds 28.4   WBC 3.40 - 10.80 10*3/mm3 8.16   RBC 3.77 - 5.28 10*6/mm3 4.59   Hemoglobin 12.0 - 15.9 g/dL 13.2   Hematocrit 34.0 - 46.6 % 39.2   RDW 12.3 - 15.4 % 13.9   MCV 79.0 - 97.0 fL 85.4   MCH 26.6 - 33.0 pg 28.8   MCHC 31.5 - 35.7 g/dL 33.7   MPV 6.0 - 12.0 fL 10.5   Platelets 140 - 450 10*3/mm3 279   (H): Data is abnormally high    Assessment and Plan:     Status post total left knee replacement    Primary osteoarthritis of left knee      Plan  1. PT/OT- WBAT LLE  2. Pain control-prns   3. IS-encourage  4. DVT proph- Mechs/ASA  5. Bowel regimen  6. Resume home medications as appropriate  7. Monitor post-op labs  8. DC planning for home, likely this afternoon      Wanda Mccabe, APRN  01/04/23  12:57 EST

## 2023-01-04 NOTE — ANESTHESIA PROCEDURE NOTES
Spinal Block      Patient reassessed immediately prior to procedure    Patient location during procedure: OR  Indication:at surgeon's request  Performed By  EL/CAA: Sophy Myers CRNA  Preanesthetic Checklist  Completed: patient identified, IV checked, site marked, risks and benefits discussed, surgical consent, monitors and equipment checked, pre-op evaluation and timeout performed  Spinal Block Prep:  Patient Position:sitting  Sterile Tech:cap, gloves, sterile barriers and mask  Prep:Chloraprep  Patient Monitoring:continuous pulse oximetry    Spinal Block Procedure  Approach:midline  Guidance:landmark technique and palpation technique  Location:L4-L5  Needle Type:Flaquito  Needle Gauge:25 G  Placement of Spinal needle event:cerebrospinal fluid aspirated  Paresthesia: no  Fluid Appearance:clear  Medications: bupivacaine (MARCAINE) 0.5 % injection - Injection   2 mL - 1/4/2023 7:30:00 AM   Post Assessment  Patient Tolerance:patient tolerated the procedure well with no apparent complications  Complications no  Additional Notes  Procedure:  Pt assisted to sitting position, with legs in position of comfort over side of bed.  Pt. instructed in optimal spine presentation, the spine was prepped/ Draped and the skin at insertion site was anesthetized with 1% Lidocaine 2 ml.  The spinal needle was then advanced until CSF flow was obtained and LA was injected:

## 2023-01-05 ENCOUNTER — TRANSITIONAL CARE MANAGEMENT TELEPHONE ENCOUNTER (OUTPATIENT)
Dept: CALL CENTER | Facility: HOSPITAL | Age: 53
End: 2023-01-05
Payer: COMMERCIAL

## 2023-01-05 ENCOUNTER — TELEPHONE (OUTPATIENT)
Dept: ORTHOPEDIC SURGERY | Facility: CLINIC | Age: 53
End: 2023-01-05
Payer: COMMERCIAL

## 2023-01-05 RX ORDER — ONDANSETRON 4 MG/1
4 TABLET, FILM COATED ORAL EVERY 8 HOURS PRN
Qty: 10 TABLET | Refills: 1 | Status: SHIPPED | OUTPATIENT
Start: 2023-01-05

## 2023-01-05 NOTE — OUTREACH NOTE
Call Center TCM Note    Flowsheet Row Responses   [facility] patient discharged from? [facility]   Does the patient have one of the following disease processes/diagnoses(primary or secondary)? Total Joint Replacement   Joint surgery performed? Knee   TCM attempt successful? No   Unsuccessful attempts Attempt 2          Lorenza Galvez RN    1/5/2023, 15:12 EST

## 2023-01-05 NOTE — OUTREACH NOTE
Prep Survey    Flowsheet Row Responses   [facility] patient discharged from? [place]   Is LACE score < 7 ? Yes   Eligibility [facility]   Date of Admission 01/04/23   Date of Discharge 01/04/23   Discharge Disposition Home or Self Care   Discharge diagnosis Status post total left knee replacement   Does the patient have one of the following disease processes/diagnoses(primary or secondary)? Total Joint Replacement   Does the patient have Home health ordered? No   Is there a DME ordered? No   Prep survey completed? Yes          ANA WATTS - Registered Nurse

## 2023-01-05 NOTE — TELEPHONE ENCOUNTER
Zofran sent to pharmacy.  She can take this prior to taking her pain medication versus trying to break the pain medication in half to see if this helps.  If ultimately she is having difficulty keeping these medications down, we may need to transition her to a [place] prescription

## 2023-01-05 NOTE — TELEPHONE ENCOUNTER
Pt SX WAS 1/4/23 AND HAS RX FOR OXYCODONE.  RX IS MAKING Pt SICK, VOMITING.   SHE IS CURRENTLY ONLY TAKING TYLENOL AND IT IS NOT HELPING.

## 2023-01-05 NOTE — OUTREACH NOTE
Call Center TCM Note    Flowsheet Row Responses   [facility] facility patient discharged from? [facility]   Does the patient have one of the following disease processes/diagnoses(primary or secondary)? Total Joint Replacement   Joint surgery performed? Knee   TCM attempt successful? No   Unsuccessful attempts Attempt 1          Lorenza Galvez RN    1/5/2023, 10:46 EST

## 2023-01-06 ENCOUNTER — TRANSITIONAL CARE MANAGEMENT TELEPHONE ENCOUNTER (OUTPATIENT)
Dept: CALL CENTER | Facility: HOSPITAL | Age: 53
End: 2023-01-06
Payer: COMMERCIAL

## 2023-01-06 NOTE — OUTREACH NOTE
Call Center TCM Note    Flowsheet Row Responses   [facility] patient discharged from? [facility]   Does the patient have one of the following disease processes/diagnoses(primary or secondary)? Total Joint Replacement   Joint surgery performed? Knee   TCM attempt successful? No   Unsuccessful attempts Attempt 3  [Eboni on verbal release-no answer]          Eva Medrano RN    1/6/2023, 08:37 EST

## 2023-01-06 NOTE — TELEPHONE ENCOUNTER
PATIENT CALLED BACK STATING THAT HER ANTINAUSEA MEDICINE WAS NOT HELPING. SHE IS WANTING TO KNOW IF SHE SHOULD TRY A DIFFERENT PAIN MEDICATION.     CAN DR. HSU OR STAFF PLEASE ADVISE.    BEST CALL BACK #: [phone]

## 2023-01-09 NOTE — TELEPHONE ENCOUNTER
I spoke with the patient to let her know what  had stated in his previous message. The patient stated she is taking the Zofran before taking the pain medication but she still is having to take and break the pill into half. The patient states she is okay with breaking it in half and in doing that it is still helping as long as  is okay with her breaking it in half.   Please advise.       Kelli Arshad

## 2023-01-10 NOTE — TELEPHONE ENCOUNTER
I spoke with the patient to let her know Dr. Martines said he was okay with her breaking the tablet in half. The patient verbalized understanding.     Kelli Arshad

## 2023-01-10 NOTE — TELEPHONE ENCOUNTER
Jesse Martines MD  You 15 hours ago (5:06 PM)       I am fine with her breaking it in half.           LVM to call back.     Kelli Arshad

## 2023-01-24 ENCOUNTER — OFFICE VISIT (OUTPATIENT)
Dept: ORTHOPEDIC SURGERY | Facility: CLINIC | Age: 53
End: 2023-01-24
Payer: COMMERCIAL

## 2023-01-24 VITALS — TEMPERATURE: 97.7 F

## 2023-01-24 DIAGNOSIS — Z96.652 S/P TOTAL KNEE ARTHROPLASTY, LEFT: Primary | ICD-10-CM

## 2023-01-24 DIAGNOSIS — M17.12 PRIMARY OSTEOARTHRITIS OF LEFT KNEE: ICD-10-CM

## 2023-01-24 PROCEDURE — 99024 POSTOP FOLLOW-UP VISIT: CPT | Performed by: PHYSICIAN ASSISTANT

## 2023-01-24 RX ORDER — TRAMADOL HYDROCHLORIDE 50 MG/1
50 TABLET ORAL EVERY 6 HOURS PRN
Qty: 40 TABLET | Refills: 0 | Status: SHIPPED | OUTPATIENT
Start: 2023-01-24 | End: 2023-02-06 | Stop reason: SDUPTHER

## 2023-01-24 RX ORDER — NAPROXEN 500 MG/1
500 TABLET ORAL 2 TIMES DAILY WITH MEALS
Qty: 60 TABLET | Refills: 3 | Status: SHIPPED | OUTPATIENT
Start: 2023-01-24

## 2023-01-24 NOTE — PROGRESS NOTES
[facility] Orthopaedic Surgery Clinic Note      Subjective     CC: Post-op (3 weeks s/p Status post total left knee replacement; DOS 01.04.2023)      HPI    Елена Sellers is a 52 y.o. female. Patient presents today 3 weeks s/p L TKA with Dr. Martines.  She reports she has been unable to keep her pain controlled so she has suffered with PT and is not gaining motion.  She did not tolerate the oxy and states the zofran knocked her out.  She did not start outpatient PT until this week.  She has been doing home health PT.        ROS:    Constiutional:Pt denies fever, chills, nausea, or vomiting.  MSK:as above        Objective      Past Medical History  Past Medical History:   Diagnosis Date   • COVID 01/2022    mild- cough and joint pain only   • Knee swelling 03/22   • Osteoarthritis     LEFT KNEE   • PONV (postoperative nausea and vomiting)          Physical Exam  Temp 97.7 °F (36.5 °C)     There is no height or weight on file to calculate BMI.    Patient is well nourished and well developed.        Ortho Exam  Left knee exam: Incision is healing well.  ROM5-50. Ligaments stable.  NVI distally. Ambulating with a cane      Assessment    Assessment:  1. S/P total knee arthroplasty, left    2. Primary osteoarthritis of left knee        Plan:  1. Recommend over the counter anti-inflammatories for pain and/or swelling  2. S/p L TKA with Dr. Martines on 1/4/23.  X-rays show well positioned TKA with no evidence of osteolysis, subsidence or fracture.  We had a long discussion with the patient regarding her lack of motion.  She is very far behind.  He really needs to get pain under control so that she can work on motion.  If she is not to 90 when she returns to get Conrad 6 weeks this will require manipulation.  Unfortunately that subjective score 1 regarding pain etc.  It is likely the oxycodone that was making her sleepy, not the Zofran.  We will try tramadol, she will continue with her naproxen.  I showed her several  exercises to do at home as well given I do not think that she has been doing enough on her own.  She return to see Dr. Martines in 3 weeks with repeat x-rays sooner if needed.    History, diagnosis and treatment plan discussed with Dr. Martines.      Claudia Mckay PA-C  01/26/23  08:31 EST          Dragon disclaimer:  Much of this encounter note is an electronic transcription/translation of spoken language to printed text. The electronic translation of spoken language may permit erroneous, or at times, nonsensical words or phrases to be inadvertently transcribed; Although I have reviewed the note for such errors, some may still exist.

## 2023-02-06 ENCOUNTER — TELEPHONE (OUTPATIENT)
Dept: ORTHOPEDIC SURGERY | Facility: CLINIC | Age: 53
End: 2023-02-06
Payer: COMMERCIAL

## 2023-02-06 RX ORDER — TRAMADOL HYDROCHLORIDE 50 MG/1
50 TABLET ORAL EVERY 6 HOURS PRN
Qty: 40 TABLET | Refills: 0 | Status: SHIPPED | OUTPATIENT
Start: 2023-02-06

## 2023-02-06 NOTE — TELEPHONE ENCOUNTER
----- Message from Елена Pan sent at 2/4/2023  5:55 PM EST -----  Regarding: Pain medication   Contact: [phone]  The Tramadol is helping with the pain , I have been taking it every 6 hours and pushing hard in therapy . the first script was for 7 days and when i refilled it the refill was only 12 pills for 3 days . is there any way i can get another script sent in to [facility]  pharmacy so that i can continue to push in therapy    thanks  rojas pan

## 2023-02-06 NOTE — TELEPHONE ENCOUNTER
Sent message to patient via EGEN to let them know refill had been sent to pharmacy.    Justa GIL (R) ROT

## 2023-02-20 ENCOUNTER — TELEPHONE (OUTPATIENT)
Dept: ORTHOPEDIC SURGERY | Facility: CLINIC | Age: 53
End: 2023-02-20
Payer: COMMERCIAL

## 2023-02-21 ENCOUNTER — TELEPHONE (OUTPATIENT)
Dept: ORTHOPEDIC SURGERY | Facility: CLINIC | Age: 53
End: 2023-02-21
Payer: COMMERCIAL

## 2023-02-21 NOTE — TELEPHONE ENCOUNTER
LVM with pt requesting she give me a call back to R/S her appt that was scheduled for today at 1:40pm.     Irma CARSON CMA ([facility]), ROT

## 2023-03-17 ENCOUNTER — OFFICE VISIT (OUTPATIENT)
Dept: ORTHOPEDIC SURGERY | Facility: CLINIC | Age: 53
End: 2023-03-17
Payer: COMMERCIAL

## 2023-03-17 DIAGNOSIS — Z96.652 S/P TOTAL KNEE ARTHROPLASTY, LEFT: ICD-10-CM

## 2023-03-17 DIAGNOSIS — M24.662 ARTHROFIBROSIS OF KNEE JOINT, LEFT: Primary | ICD-10-CM

## 2023-03-17 PROCEDURE — 99024 POSTOP FOLLOW-UP VISIT: CPT | Performed by: ORTHOPAEDIC SURGERY

## 2023-03-17 RX ORDER — ACETAMINOPHEN 325 MG/1
1000 TABLET ORAL ONCE
Status: CANCELLED | OUTPATIENT
Start: 2023-03-17 | End: 2023-03-17

## 2023-03-17 NOTE — PROGRESS NOTES
Orthopaedic Clinic Note:  Knee Post Op    Chief Complaint   Patient presents with   • Follow-up     7.5 week follow up - 2.5 month s/p total knee arthroplasty        HPI     Ms. Sellers is 2.5  month(s) s/p left total knee arthroplasty.  Patient returns to clinic today for follow-up assessment of left knee replacement.  She was seen for 3-week follow-up but failed to show up for 6-week follow-up to assess her range of motion.  She comes clinic today for follow-up assessment and states she is improving but still has significant stiffness.  Rates her pain a 2/10 on the pain scale.  She is ambulating with the assistance of a cane.  She denies fevers chills or constitutional symptoms.  Overall she is improving./    Past Medical History:   Diagnosis Date   • COVID 01/2022    mild- cough and joint pain only   • Knee swelling 03/22   • Osteoarthritis     LEFT KNEE   • PONV (postoperative nausea and vomiting)       Past Surgical History:   Procedure Laterality Date   • BREAST SURGERY  2017    reduction   • HYSTERECTOMY  2008   • KNEE SURGERY Left     torn meniscus, patellar dislocation    • TONSILLECTOMY  1974   • TOTAL KNEE ARTHROPLASTY Left 1/4/2023    Procedure: TOTAL KNEE ARTHROPLASTY - LEFT;  Surgeon: Jesse Martines MD;  [facility]: [facility];  Service: Orthopedics;  Laterality: Left;   • TUBAL ABDOMINAL LIGATION  2002   • WISDOM TOOTH EXTRACTION       Family History   Problem Relation Age of Onset   • Arthritis Mother    • COPD Mother    • Thyroid disease Mother    • Obesity Mother    • Hypertension Mother    • Breast cancer Mother         bilat mastectomy   • Anesthesia problems Mother    • Cancer Mother    • Rheumatologic disease Mother    • Obesity Father       Social History     Socioeconomic History   • Marital status:    Tobacco Use   • Smoking status: Never   • Smokeless tobacco: Never   Vaping Use   • Vaping Use: Never used   Substance and Sexual Activity   • Alcohol use: Yes     Alcohol/week: 4.0  standard drinks     Types: 4 Glasses of wine per week     Comment: 4 GLASSES/WEEK   • Drug use: Never   • Sexual activity: Yes     Partners: Male     Birth control/protection: None, Tubal ligation     Comment: Hysterectomy      Current Outpatient Medications on File Prior to Visit   Medication Sig Dispense Refill   • aspirin 81 MG EC tablet Take 1 tablet by mouth Every 12 (Twelve) Hours for 1 month as directed for VTE Prophylaxis 60 tablet 0   • docusate sodium (Colace) 100 MG capsule Take 1 capsule by mouth 2 (Two) Times a Day. 60 capsule 0   • naproxen (NAPROSYN) 500 MG tablet Take 1 tablet by mouth 2 (Two) Times a Day With Meals. 60 tablet 3   • ondansetron (Zofran) 4 MG tablet Take 1 tablet by mouth Every 8 (Eight) Hours As Needed for Nausea or Vomiting. 10 tablet 1   • oxyCODONE (ROXICODONE) 5 MG immediate release tablet Take 1 tablet by mouth Every 4 (Four) Hours As Needed for Moderate Pain. 40 tablet 0   • traMADol (ULTRAM) 50 MG tablet Take 1 tablet by mouth Every 6 (Six) Hours As Needed for Moderate Pain. 40 tablet 0     No current facility-administered medications on file prior to visit.      Allergies   Allergen Reactions   • Codeine Nausea And Vomiting     As a child per pt         Review of Systems   Constitutional: Negative.    HENT: Negative.    Eyes: Negative.    Respiratory: Negative.    Cardiovascular: Negative.    Gastrointestinal: Negative.    Endocrine: Negative.    Genitourinary: Negative.    Musculoskeletal: Positive for arthralgias.   Skin: Negative.    Allergic/Immunologic: Negative.    Neurological: Negative.    Hematological: Negative.    Psychiatric/Behavioral: Negative.         Physical Exam  not currently breastfeeding.    There is no height or weight on file to calculate BMI.    GENERAL APPEARANCE: awake, alert, oriented, in no acute distress and well developed, well nourished  LUNGS:  breathing nonlabored  EXTREMITIES: no clubbing, cyanosis  PERIPHERAL PULSES: palpable dorsalis pedis  and posterior tibial pulses bilaterally.    GAIT:  Normal          Left Knee Exam:  ----------  ALIGNMENT: neutral  ----------  RANGE OF MOTION:  Decreased (0 - 90 degrees) with no extensor lag  LIGAMENTOUS STABILITY:   stable to varus and valgus stress at terminal extension and 30 degrees without any evidence of laxity  ----------  STRENGTH:  KNEE FLEXION 5/5  KNEE EXTENSION  5/5  ANKLE DORSIFLEXION  5/5  ANKLE PLANTARFLEXION  5/5  ----------  PAIN WITH PALPATION:denies tenderness to palpation about the knee  KNEE EFFUSION: yes, trace effusion  PAIN WITH KNEE ROM: no  PATELLAR CREPITUS:  no  ----------  SENSATION TO LIGHT TOUCH:  DEEP PERONEAL/SUPERFICIAL PERONEAL/SURAL/SAPHENOUS/TIBIAL:    intact  ----------  EDEMA:  no  ERYTHEMA:    no  WOUNDS/INCISIONS:   yes, well healed surgical incision without evidence of erythema or drainage  _____________________________________________________________________  _____________________________________________________________________    RADIOGRAPHIC FINDINGS:   Indication: Status post left total knee arthroplasty    Comparison: Todays xrays were compared to previous xrays from 1/24/2023    Knee films: Demonstrate well positioned knee arthroplasty components in satisfactory alignment without evidence of wear, loosening, subsidence, fracture, or osteolysis and No significant changes compared to prior radiographs.       Assessment/Plan:   Diagnosis Plan   1. Arthrofibrosis of knee joint, left        2. S/P total knee arthroplasty, left  XR Knee 3 View Left        Patient demonstrates significant stiffness in her knee.  Given her lack of improvement in range of motion and being nearly 3 months out from surgery, recommend manipulation under anesthesia.  I discussed with her that her failure to follow-up at 6 weeks for postop assessment has delayed the ability to address this arthrofibrosis in a timely fashion.  We left multiple messages with her to call to schedule that 6-week  follow-up but she failed to return these calls to initiate the 6-week follow-up.  As a result, her scar tissue has matured more and she is now in the latter part of her recovery.  As a result, range of motion may suffer despite the manipulation as the scar tissue is now more mature.  Nevertheless, I do believe it is worthwhile to proceed with manipulation under anesthesia to optimize her range of motion.  I discussed aggressive physical therapy is required after the manipulation to maintain the motion gains as recurrent arthrofibrosis can occur even after the manipulation.  She expresses understanding.  We will schedule her for Monday for manipulation under anesthesia.    The patient has clinical and radiographic evidence of left knee arthrofibrosis which is severely restricting the patient's activities as well as quality of life. The recommendation at this time is to proceed with a left knee manipulation under anesthesia with the goal to improve patient function, decrease pain, and improve mobility. The risks, benefits, potential complications, and alternatives were discussed with the patient in detail. Risks included but were not limited to bleeding, infection, anesthesia risks, damage to neurovascular structures, extensor mechanism tear, iatrogenic fracture, pain, continued pain, iatrogenic fracture, possible need for future surgery including the potential for amputation, blood clots, myocardial infarction, stroke, and death. Gifty-operative blood management and the potential for blood transfusion were discussed with risks and options clearly outlined. Specific details of the surgical procedure, hospitalization, recovery, rehabilitation, and long-term precautions were also presented. Pre-operative teaching was provided. Implant/prosthesis and equipment selection was outlined, and the many options available were explained; the final choice will be made at the time of the procedure to match the anatomy and condition  of the bone, ligaments, tendons, and muscles. Given this instruction, the patient elected to proceed with the left knee manipulation under anesthesia.      Jesse Martines MD  03/17/23  08:35 EDT

## 2023-03-20 ENCOUNTER — ANESTHESIA EVENT CONVERTED (OUTPATIENT)
Dept: ANESTHESIOLOGY | Facility: HOSPITAL | Age: 53
End: 2023-03-20
Payer: COMMERCIAL

## 2023-03-20 ENCOUNTER — HOSPITAL ENCOUNTER (OUTPATIENT)
Facility: HOSPITAL | Age: 53
Setting detail: HOSPITAL OUTPATIENT SURGERY
Discharge: HOME | End: 2023-03-20
Attending: ORTHOPAEDIC SURGERY | Admitting: ORTHOPAEDIC SURGERY
Payer: COMMERCIAL

## 2023-03-20 ENCOUNTER — ANESTHESIA EVENT (OUTPATIENT)
Dept: PERIOP | Facility: HOSPITAL | Age: 53
End: 2023-03-20
Payer: COMMERCIAL

## 2023-03-20 ENCOUNTER — ANESTHESIA (OUTPATIENT)
Dept: PERIOP | Facility: HOSPITAL | Age: 53
End: 2023-03-20
Payer: COMMERCIAL

## 2023-03-20 VITALS
RESPIRATION RATE: 16 BRPM | SYSTOLIC BLOOD PRESSURE: 177 MMHG | WEIGHT: 200 LBS | HEIGHT: 63 IN | DIASTOLIC BLOOD PRESSURE: 99 MMHG | TEMPERATURE: 97 F | OXYGEN SATURATION: 96 % | HEART RATE: 80 BPM | BODY MASS INDEX: 35.44 KG/M2

## 2023-03-20 DIAGNOSIS — M24.662 ARTHROFIBROSIS OF KNEE JOINT, LEFT: ICD-10-CM

## 2023-03-20 LAB
ANION GAP SERPL CALCULATED.3IONS-SCNC: 11 MMOL/L (ref 5–15)
BASOPHILS # BLD AUTO: 0.03 10*3/MM3 (ref 0–0.2)
BASOPHILS NFR BLD AUTO: 0.5 % (ref 0–1.5)
BUN SERPL-MCNC: 10 MG/DL (ref 6–20)
BUN/CREAT SERPL: 14.9 (ref 7–25)
CALCIUM SPEC-SCNC: 9.3 MG/DL (ref 8.6–10.5)
CHLORIDE SERPL-SCNC: 105 MMOL/L (ref 98–107)
CO2 SERPL-SCNC: 25 MMOL/L (ref 22–29)
CREAT SERPL-MCNC: 0.67 MG/DL (ref 0.57–1)
DEPRECATED RDW RBC AUTO: 42.2 FL (ref 37–54)
EGFRCR SERPLBLD CKD-EPI 2021: 106 ML/MIN/1.73
EOSINOPHIL # BLD AUTO: 0.03 10*3/MM3 (ref 0–0.4)
EOSINOPHIL NFR BLD AUTO: 0.5 % (ref 0.3–6.2)
ERYTHROCYTE [DISTWIDTH] IN BLOOD BY AUTOMATED COUNT: 13.6 % (ref 12.3–15.4)
GLUCOSE BLDC GLUCOMTR-MCNC: 78 MG/DL (ref 70–130)
GLUCOSE SERPL-MCNC: 84 MG/DL (ref 65–99)
HCT VFR BLD AUTO: 42.4 % (ref 34–46.6)
HGB BLD-MCNC: 13.8 G/DL (ref 12–15.9)
IMM GRANULOCYTES # BLD AUTO: 0.01 10*3/MM3 (ref 0–0.05)
IMM GRANULOCYTES NFR BLD AUTO: 0.2 % (ref 0–0.5)
LYMPHOCYTES # BLD AUTO: 2.51 10*3/MM3 (ref 0.7–3.1)
LYMPHOCYTES NFR BLD AUTO: 39.5 % (ref 19.6–45.3)
MCH RBC QN AUTO: 27.6 PG (ref 26.6–33)
MCHC RBC AUTO-ENTMCNC: 32.5 G/DL (ref 31.5–35.7)
MCV RBC AUTO: 84.8 FL (ref 79–97)
MONOCYTES # BLD AUTO: 0.6 10*3/MM3 (ref 0.1–0.9)
MONOCYTES NFR BLD AUTO: 9.4 % (ref 5–12)
NEUTROPHILS NFR BLD AUTO: 3.18 10*3/MM3 (ref 1.7–7)
NEUTROPHILS NFR BLD AUTO: 49.9 % (ref 42.7–76)
NRBC BLD AUTO-RTO: 0 /100 WBC (ref 0–0.2)
PLATELET # BLD AUTO: 297 10*3/MM3 (ref 140–450)
PMV BLD AUTO: 10.7 FL (ref 6–12)
POTASSIUM SERPL-SCNC: 4 MMOL/L (ref 3.5–5.2)
RBC # BLD AUTO: 5 10*6/MM3 (ref 3.77–5.28)
SODIUM SERPL-SCNC: 141 MMOL/L (ref 136–145)
WBC NRBC COR # BLD: 6.36 10*3/MM3 (ref 3.4–10.8)

## 2023-03-20 PROCEDURE — 25010000002 FENTANYL CITRATE (PF) 50 MCG/ML SOLUTION

## 2023-03-20 PROCEDURE — 25010000002 ROPIVACAINE PER 1 MG: Performed by: NURSE ANESTHETIST, CERTIFIED REGISTERED

## 2023-03-20 PROCEDURE — 25010000002 PROPOFOL 10 MG/ML EMULSION: Performed by: NURSE ANESTHETIST, CERTIFIED REGISTERED

## 2023-03-20 PROCEDURE — 80048 BASIC METABOLIC PNL TOTAL CA: CPT | Performed by: ORTHOPAEDIC SURGERY

## 2023-03-20 PROCEDURE — 85025 COMPLETE CBC W/AUTO DIFF WBC: CPT | Performed by: ORTHOPAEDIC SURGERY

## 2023-03-20 PROCEDURE — 27570 FIXATION OF KNEE JOINT: CPT | Performed by: ORTHOPAEDIC SURGERY

## 2023-03-20 PROCEDURE — S0260 H&P FOR SURGERY: HCPCS | Performed by: PHYSICIAN ASSISTANT

## 2023-03-20 PROCEDURE — 25010000002 ONDANSETRON PER 1 MG

## 2023-03-20 PROCEDURE — 82962 GLUCOSE BLOOD TEST: CPT

## 2023-03-20 RX ORDER — SCOLOPAMINE TRANSDERMAL SYSTEM 1 MG/1
1 PATCH, EXTENDED RELEASE TRANSDERMAL ONCE
Status: DISCONTINUED | OUTPATIENT
Start: 2023-03-20 | End: 2023-03-20 | Stop reason: HOSPADM

## 2023-03-20 RX ORDER — FENTANYL CITRATE 50 UG/ML
INJECTION, SOLUTION INTRAMUSCULAR; INTRAVENOUS
Status: COMPLETED
Start: 2023-03-20 | End: 2023-03-20

## 2023-03-20 RX ORDER — FAMOTIDINE 10 MG/ML
20 INJECTION, SOLUTION INTRAVENOUS ONCE
Status: CANCELLED | OUTPATIENT
Start: 2023-03-20 | End: 2023-03-20

## 2023-03-20 RX ORDER — FENTANYL CITRATE 50 UG/ML
50 INJECTION, SOLUTION INTRAMUSCULAR; INTRAVENOUS
Status: DISCONTINUED | OUTPATIENT
Start: 2023-03-20 | End: 2023-03-20 | Stop reason: HOSPADM

## 2023-03-20 RX ORDER — SODIUM CHLORIDE 9 MG/ML
40 INJECTION, SOLUTION INTRAVENOUS AS NEEDED
Status: DISCONTINUED | OUTPATIENT
Start: 2023-03-20 | End: 2023-03-20 | Stop reason: HOSPADM

## 2023-03-20 RX ORDER — SODIUM CHLORIDE 0.9 % (FLUSH) 0.9 %
10 SYRINGE (ML) INJECTION EVERY 12 HOURS SCHEDULED
Status: DISCONTINUED | OUTPATIENT
Start: 2023-03-20 | End: 2023-03-20 | Stop reason: HOSPADM

## 2023-03-20 RX ORDER — LIDOCAINE HYDROCHLORIDE 10 MG/ML
INJECTION, SOLUTION EPIDURAL; INFILTRATION; INTRACAUDAL; PERINEURAL AS NEEDED
Status: DISCONTINUED | OUTPATIENT
Start: 2023-03-20 | End: 2023-03-20 | Stop reason: SURG

## 2023-03-20 RX ORDER — ONDANSETRON 2 MG/ML
4 INJECTION INTRAMUSCULAR; INTRAVENOUS ONCE
Status: COMPLETED | OUTPATIENT
Start: 2023-03-20 | End: 2023-03-20

## 2023-03-20 RX ORDER — FAMOTIDINE 20 MG/1
20 TABLET, FILM COATED ORAL ONCE
Status: COMPLETED | OUTPATIENT
Start: 2023-03-20 | End: 2023-03-20

## 2023-03-20 RX ORDER — SODIUM CHLORIDE 0.9 % (FLUSH) 0.9 %
10 SYRINGE (ML) INJECTION AS NEEDED
Status: DISCONTINUED | OUTPATIENT
Start: 2023-03-20 | End: 2023-03-20 | Stop reason: HOSPADM

## 2023-03-20 RX ORDER — LABETALOL HYDROCHLORIDE 5 MG/ML
5 INJECTION, SOLUTION INTRAVENOUS ONCE
Status: DISCONTINUED | OUTPATIENT
Start: 2023-03-20 | End: 2023-03-20 | Stop reason: HOSPADM

## 2023-03-20 RX ORDER — ROPIVACAINE HYDROCHLORIDE 2 MG/ML
INJECTION, SOLUTION EPIDURAL; INFILTRATION; PERINEURAL CONTINUOUS
Status: DISCONTINUED | OUTPATIENT
Start: 2023-03-20 | End: 2023-03-20 | Stop reason: HOSPADM

## 2023-03-20 RX ORDER — ONDANSETRON 2 MG/ML
INJECTION INTRAMUSCULAR; INTRAVENOUS
Status: COMPLETED
Start: 2023-03-20 | End: 2023-03-20

## 2023-03-20 RX ORDER — LIDOCAINE HYDROCHLORIDE 10 MG/ML
0.5 INJECTION, SOLUTION EPIDURAL; INFILTRATION; INTRACAUDAL; PERINEURAL ONCE AS NEEDED
Status: COMPLETED | OUTPATIENT
Start: 2023-03-20 | End: 2023-03-20

## 2023-03-20 RX ORDER — PROPOFOL 10 MG/ML
VIAL (ML) INTRAVENOUS AS NEEDED
Status: DISCONTINUED | OUTPATIENT
Start: 2023-03-20 | End: 2023-03-20 | Stop reason: SURG

## 2023-03-20 RX ORDER — BUPIVACAINE HYDROCHLORIDE 2.5 MG/ML
INJECTION, SOLUTION EPIDURAL; INFILTRATION; INTRACAUDAL
Status: COMPLETED | OUTPATIENT
Start: 2023-03-20 | End: 2023-03-20

## 2023-03-20 RX ORDER — SODIUM CHLORIDE, SODIUM LACTATE, POTASSIUM CHLORIDE, CALCIUM CHLORIDE 600; 310; 30; 20 MG/100ML; MG/100ML; MG/100ML; MG/100ML
9 INJECTION, SOLUTION INTRAVENOUS CONTINUOUS
Status: DISCONTINUED | OUTPATIENT
Start: 2023-03-20 | End: 2023-03-20 | Stop reason: HOSPADM

## 2023-03-20 RX ORDER — LABETALOL HYDROCHLORIDE 5 MG/ML
INJECTION, SOLUTION INTRAVENOUS
Status: DISCONTINUED
Start: 2023-03-20 | End: 2023-03-20 | Stop reason: HOSPADM

## 2023-03-20 RX ORDER — MIDAZOLAM HYDROCHLORIDE 1 MG/ML
1 INJECTION INTRAMUSCULAR; INTRAVENOUS
Status: DISCONTINUED | OUTPATIENT
Start: 2023-03-20 | End: 2023-03-20 | Stop reason: HOSPADM

## 2023-03-20 RX ORDER — ACETAMINOPHEN 500 MG
1000 TABLET ORAL ONCE
Status: COMPLETED | OUTPATIENT
Start: 2023-03-20 | End: 2023-03-20

## 2023-03-20 RX ADMIN — FENTANYL CITRATE 50 MCG: 50 INJECTION, SOLUTION INTRAMUSCULAR; INTRAVENOUS at 14:58

## 2023-03-20 RX ADMIN — Medication 1000 MG: at 15:10

## 2023-03-20 RX ADMIN — SCOPALAMINE 1 PATCH: 1 PATCH, EXTENDED RELEASE TRANSDERMAL at 13:08

## 2023-03-20 RX ADMIN — ONDANSETRON 4 MG: 2 INJECTION INTRAMUSCULAR; INTRAVENOUS at 15:33

## 2023-03-20 RX ADMIN — PROPOFOL 150 MG: 10 INJECTION, EMULSION INTRAVENOUS at 14:28

## 2023-03-20 RX ADMIN — LIDOCAINE HYDROCHLORIDE 0.2 ML: 10 INJECTION, SOLUTION EPIDURAL; INFILTRATION; INTRACAUDAL; PERINEURAL at 13:06

## 2023-03-20 RX ADMIN — SODIUM CHLORIDE, POTASSIUM CHLORIDE, SODIUM LACTATE AND CALCIUM CHLORIDE 9 ML/HR: 600; 310; 30; 20 INJECTION, SOLUTION INTRAVENOUS at 13:06

## 2023-03-20 RX ADMIN — ACETAMINOPHEN 1000 MG: 500 TABLET ORAL at 13:08

## 2023-03-20 RX ADMIN — PROPOFOL 50 MG: 10 INJECTION, EMULSION INTRAVENOUS at 14:35

## 2023-03-20 RX ADMIN — FAMOTIDINE 20 MG: 20 TABLET ORAL at 13:08

## 2023-03-20 RX ADMIN — FENTANYL CITRATE 50 MCG: 50 INJECTION, SOLUTION INTRAMUSCULAR; INTRAVENOUS at 15:08

## 2023-03-20 RX ADMIN — BUPIVACAINE HYDROCHLORIDE 30 ML: 2.5 INJECTION, SOLUTION EPIDURAL; INFILTRATION; INTRACAUDAL; PERINEURAL at 14:40

## 2023-03-20 RX ADMIN — LIDOCAINE HYDROCHLORIDE 50 MG: 10 INJECTION, SOLUTION EPIDURAL; INFILTRATION; INTRACAUDAL; PERINEURAL at 14:28

## 2023-03-20 RX ADMIN — PROPOFOL 50 MG: 10 INJECTION, EMULSION INTRAVENOUS at 14:32

## 2023-03-20 NOTE — DISCHARGE INSTRUCTIONS
Discharge instructions for manipulation under anesthesia of knee    Aggressive PT for range of motion and strengthening exercises.  Goal of 0 to 120 degrees of motion to be maintained until postop appointment.    Resume tramadol for pain control

## 2023-03-20 NOTE — ANESTHESIA POSTPROCEDURE EVALUATION
Patient: Елена Sellers    Procedure Summary     Date: 03/20/23 [facility]    Anesthesia Start: 1427 Anesthesia Stop: 1456    Procedure: KNEE MANIPULATION (Left: Knee) Diagnosis:       Arthrofibrosis of knee joint, left      (Arthrofibrosis of knee joint, left [M24.662])    Surgeons: Jesse Martines MD Provider: Jassi Zarate MD    Anesthesia Type: general ASA Status: 2          Anesthesia Type: general    Vitals  Vitals Value Taken Time   /112 03/20/23 1456   Temp 97 °F (36.1 °C) 03/20/23 1456   Pulse 82 03/20/23 1456   Resp 13 03/20/23 1456   SpO2 98 % 03/20/23 1456           Post Anesthesia Care and Evaluation    Patient location during evaluation: PACU  Patient participation: complete - patient participated  Level of consciousness: awake and alert  Pain score: 5  Pain management: adequate    Airway patency: patent  Anesthetic complications: No anesthetic complications  PONV Status: none  Cardiovascular status: hemodynamically stable and acceptable  Respiratory status: nonlabored ventilation, acceptable and nasal cannula  Hydration status: acceptable

## 2023-03-20 NOTE — ANESTHESIA PROCEDURE NOTES
LEFT ACB cath      Patient reassessed immediately prior to procedure    Reason for block: at surgeon's request and post-op pain management  Performed by  CRNA/CAA: Johnson Cadena CRNA  Assisted by: Annel Blue CRNA  Preanesthetic Checklist  Completed: patient identified, IV checked, site marked, risks and benefits discussed, surgical consent, monitors and equipment checked, pre-op evaluation and timeout performed  Prep:  Pt Position: supine  Sterile barriers:cap, gloves, mask, sterile barriers and washed/disinfected hands  Prep: ChloraPrep  Patient monitoring: blood pressure monitoring, continuous pulse oximetry and EKG  Procedure    Sedation: no  Performed under: general  Guidance:ultrasound guided    ULTRASOUND INTERPRETATION.  Using ultrasound guidance a 20 G gauge needle was placed in close proximity to the nerve, at which point, under ultrasound guidance anesthetic was injected in the area of the nerve and spread of the anesthesia was seen on ultrasound in close proximity thereto.  There were no abnormalities seen on ultrasound; a digital image was taken; and the patient tolerated the procedure with no complications. Images:still images obtained, printed/placed on chart    Laterality:left  Block Type:adductor canal block  Injection Technique:catheter  Needle Type:Tuohy and echogenic  Needle Gauge:18 G  Resistance on Injection: none  Catheter Size:20 G (20g)  Cath Depth at skin: 13 cm    Medications Used: bupivacaine PF (MARCAINE) 0.25 % injection - Injection   30 mL - 3/20/2023 2:40:00 PM      Post Assessment  Injection Assessment: negative aspiration for heme, incremental injection and no paresthesia on injection  Patient Tolerance:comfortable throughout block  Complications:no  Additional Notes  CATHETER   A high-frequency linear transducer, with sterile cover, was placed on the anterior mid-thigh (between the anterior superior iliac spine and patella). The transducer was then moved medially to  "identify the Sartorius muscle (Marely), Vastus Medialis muscle (VMM), Superficial Femoral Artery (SFA) and Vein. The transducer was then moved cephalad or caudad to position the SFA in the middle of the Marely. The insertion site was prepped and draped in sterile fashion. Skin and cutaneous tissue was infiltrated with 2-5 ml of 1% Lidocaine. Using ultrasound-guidance, an 18-gauge Surgimatixiplex Ultra 360 Touhy needle was advanced in plane from lateral to medial. Preservative-free normal saline was utilized for hydro-dissection of tissue, advancement of Touhy, and to confirm needle placement below the fascial plane of the Marely where the Nerve to the VMM is located. Local anesthetic (LA) 5 ml deposited here. The Touhy needle continues its path lateral to the SFA at the level of the Saphenous Nerve. The remainder of the LA was deposited at the 10-11 o'clock position of the SFA. This injection created a space between the Marely and the SFA. Aspiration every 5 ml to prevent intravascular injection. Injection was completed with negative aspiration of blood and negative intravascular injection. Injection pressures were normal with minimal resistance. A 20-gauge Surgimatixiplex Echo catheter was placed through the needle and advance out the tip of the Touhy 3-5 cm anterior to the SFA. The Touhy needle was then removed, and final catheter position verified at the 12 o'clock position to the SFA. The catheter was secured in the usual fashion with skin glue, benzoin, steri-strips, CHG tegaderm and label noting \"Nerve Block Catheter\". Jerk tape applied at yellow connector and catheter connection.           "

## 2023-03-20 NOTE — ANESTHESIA PREPROCEDURE EVALUATION
Anesthesia Evaluation     history of anesthetic complications: PONV               Airway   Mallampati: I  TM distance: >3 FB  Neck ROM: full  No difficulty expected  Dental      Pulmonary    Cardiovascular     ECG reviewed        Neuro/Psych  GI/Hepatic/Renal/Endo    (+) obesity,       Musculoskeletal     Abdominal    Substance History      OB/GYN          Other                        Anesthesia Plan    ASA 2     general     intravenous induction     Anesthetic plan, risks, benefits, and alternatives have been provided, discussed and informed consent has been obtained with: patient.    Plan discussed with CRNA.        CODE STATUS:

## 2023-03-20 NOTE — H&P
Jesse Martines MD   Physician  Specialty:  Orthopedic Surgery  Progress Notes     Signed  Encounter Date:  3/17/2023   Related encounter: Office Visit from 3/17/2023 in [facility] with Jesse Martines MD     Signed        Expand All Collapse All    Orthopaedic Clinic Note:  Knee Post Op          Chief Complaint   Patient presents with   • Follow-up       7.5 week follow up - 2.5 month s/p total knee arthroplasty         HPI      Ms. Sellers is 2.5  month(s) s/p left total knee arthroplasty.  Patient returns to clinic today for follow-up assessment of left knee replacement.  She was seen for 3-week follow-up but failed to show up for 6-week follow-up to assess her range of motion.  She comes clinic today for follow-up assessment and states she is improving but still has significant stiffness.  Rates her pain a 2/10 on the pain scale.  She is ambulating with the assistance of a cane.  She denies fevers chills or constitutional symptoms.  Overall she is improving./     Medical History        Past Medical History:   Diagnosis Date   • COVID 01/2022     mild- cough and joint pain only   • Knee swelling 03/22   • Osteoarthritis       LEFT KNEE   • PONV (postoperative nausea and vomiting)           Surgical History         Past Surgical History:   Procedure Laterality Date   • BREAST SURGERY   2017     reduction   • HYSTERECTOMY   2008   • KNEE SURGERY Left       torn meniscus, patellar dislocation    • TONSILLECTOMY   1974   • TOTAL KNEE ARTHROPLASTY Left 1/4/2023     Procedure: TOTAL KNEE ARTHROPLASTY - LEFT;  Surgeon: Jesse Martines MD;  Location: [facility];  Service: Orthopedics;  Laterality: Left;   • TUBAL ABDOMINAL LIGATION   2002   • WISDOM TOOTH EXTRACTION                   Family History   Problem Relation Age of Onset   • Arthritis Mother     • COPD Mother     • Thyroid disease Mother     • Obesity Mother     • Hypertension Mother     • Breast  cancer Mother           bilat mastectomy   • Anesthesia problems Mother     • Cancer Mother     • Rheumatologic disease Mother     • Obesity Father        Social History   Social History            Socioeconomic History   • Marital status:    Tobacco Use   • Smoking status: Never   • Smokeless tobacco: Never   Vaping Use   • Vaping Use: Never used   Substance and Sexual Activity   • Alcohol use: Yes       Alcohol/week: 4.0 standard drinks       Types: 4 Glasses of wine per week       Comment: 4 GLASSES/WEEK   • Drug use: Never   • Sexual activity: Yes       Partners: Male       Birth control/protection: None, Tubal ligation       Comment: Hysterectomy                Current Outpatient Medications on File Prior to Visit   Medication Sig Dispense Refill   • aspirin 81 MG EC tablet Take 1 tablet by mouth Every 12 (Twelve) Hours for 1 month as directed for VTE Prophylaxis 60 tablet 0   • docusate sodium (Colace) 100 MG capsule Take 1 capsule by mouth 2 (Two) Times a Day. 60 capsule 0   • naproxen (NAPROSYN) 500 MG tablet Take 1 tablet by mouth 2 (Two) Times a Day With Meals. 60 tablet 3   • ondansetron (Zofran) 4 MG tablet Take 1 tablet by mouth Every 8 (Eight) Hours As Needed for Nausea or Vomiting. 10 tablet 1   • oxyCODONE (ROXICODONE) 5 MG immediate release tablet Take 1 tablet by mouth Every 4 (Four) Hours As Needed for Moderate Pain. 40 tablet 0   • traMADol (ULTRAM) 50 MG tablet Take 1 tablet by mouth Every 6 (Six) Hours As Needed for Moderate Pain. 40 tablet 0      No current facility-administered medications on file prior to visit.            Allergies   Allergen Reactions   • Codeine Nausea And Vomiting       As a child per pt          Review of Systems   Constitutional: Negative.    HENT: Negative.    Eyes: Negative.    Respiratory: Negative.    Cardiovascular: Negative.    Gastrointestinal: Negative.    Endocrine: Negative.    Genitourinary: Negative.    Musculoskeletal: Positive for arthralgias.    Skin: Negative.    Allergic/Immunologic: Negative.    Neurological: Negative.    Hematological: Negative.    Psychiatric/Behavioral: Negative.          Physical Exam  not currently breastfeeding.     There is no height or weight on file to calculate BMI.     GENERAL APPEARANCE: awake, alert, oriented, in no acute distress and well developed, well nourished  LUNGS:  breathing nonlabored  EXTREMITIES: no clubbing, cyanosis  PERIPHERAL PULSES: palpable dorsalis pedis and posterior tibial pulses bilaterally.    GAIT:  Normal           Left Knee Exam:  ----------  ALIGNMENT: neutral  ----------  RANGE OF MOTION:  Decreased (0 - 90 degrees) with no extensor lag  LIGAMENTOUS STABILITY:   stable to varus and valgus stress at terminal extension and 30 degrees without any evidence of laxity  ----------  STRENGTH:  KNEE FLEXION 5/5  KNEE EXTENSION  5/5  ANKLE DORSIFLEXION  5/5  ANKLE PLANTARFLEXION  5/5  ----------  PAIN WITH PALPATION:denies tenderness to palpation about the knee  KNEE EFFUSION: yes, trace effusion  PAIN WITH KNEE ROM: no  PATELLAR CREPITUS:  no  ----------  SENSATION TO LIGHT TOUCH:  DEEP PERONEAL/SUPERFICIAL PERONEAL/SURAL/SAPHENOUS/TIBIAL:    intact  ----------  EDEMA:  no  ERYTHEMA:    no  WOUNDS/INCISIONS:   yes, well healed surgical incision without evidence of erythema or drainage  _____________________________________________________________________  _____________________________________________________________________     RADIOGRAPHIC FINDINGS:   Indication: Status post left total knee arthroplasty     Comparison: Todays xrays were compared to previous xrays from 1/24/2023    Knee films: Demonstrate well positioned knee arthroplasty components in satisfactory alignment without evidence of wear, loosening, subsidence, fracture, or osteolysis and No significant changes compared to prior radiographs.         Assessment/Plan:    Diagnosis Plan   1. Arthrofibrosis of knee joint, left          2. S/P  total knee arthroplasty, left  XR Knee 3 View Left          Patient demonstrates significant stiffness in her knee.  Given her lack of improvement in range of motion and being nearly 3 months out from surgery, recommend manipulation under anesthesia.  I discussed with her that her failure to follow-up at 6 weeks for postop assessment has delayed the ability to address this arthrofibrosis in a timely fashion.  We left multiple messages with her to call to schedule that 6-week follow-up but she failed to return these calls to initiate the 6-week follow-up.  As a result, her scar tissue has matured more and she is now in the latter part of her recovery.  As a result, range of motion may suffer despite the manipulation as the scar tissue is now more mature.  Nevertheless, I do believe it is worthwhile to proceed with manipulation under anesthesia to optimize her range of motion.  I discussed aggressive physical therapy is required after the manipulation to maintain the motion gains as recurrent arthrofibrosis can occur even after the manipulation.  She expresses understanding.  We will schedule her for Monday for manipulation under anesthesia.     The patient has clinical and radiographic evidence of left knee arthrofibrosis which is severely restricting the patient's activities as well as quality of life. The recommendation at this time is to proceed with a left knee manipulation under anesthesia with the goal to improve patient function, decrease pain, and improve mobility. The risks, benefits, potential complications, and alternatives were discussed with the patient in detail. Risks included but were not limited to bleeding, infection, anesthesia risks, damage to neurovascular structures, extensor mechanism tear, iatrogenic fracture, pain, continued pain, iatrogenic fracture, possible need for future surgery including the potential for amputation, blood clots, myocardial infarction, stroke, and death. Gifty-operative  blood management and the potential for blood transfusion were discussed with risks and options clearly outlined. Specific details of the surgical procedure, hospitalization, recovery, rehabilitation, and long-term precautions were also presented. Pre-operative teaching was provided. Implant/prosthesis and equipment selection was outlined, and the many options available were explained; the final choice will be made at the time of the procedure to match the anatomy and condition of the bone, ligaments, tendons, and muscles. Given this instruction, the patient elected to proceed with the left knee manipulation under anesthesia.        Jesse Martines MD  03/17/23  08:35 EDT              Revision History                              [facility] Pre-op    Full history and physical note from office is up to date.     There were no vitals taken for this visit.  Patient denies allergy to contrast dye or latex  IMM:  Influenza: No  Pneumococcal: No  Tetanus: Up-to-date  Lungs: Clear to auscultation bilaterally the bases  Cardiovascular: S1-S2 without rubs murmurs or gallops  Abdomen: Soft, nontender, bowel sounds present throughout.    LAB Results:  Lab Results   Component Value Date    WBC 8.16 12/22/2022    HGB 13.2 12/22/2022    HCT 39.2 12/22/2022    MCV 85.4 12/22/2022     12/22/2022    NEUTROABS 4.26 12/22/2022    GLUCOSE 109 (H) 12/22/2022    BUN 12 12/22/2022    CREATININE 0.86 12/22/2022     12/22/2022    K 3.9 12/22/2022     12/22/2022    CO2 25.0 12/22/2022    CALCIUM 9.3 12/22/2022    ALBUMIN 4.40 12/22/2022    AST 16 12/22/2022    ALT 11 12/22/2022    BILITOT 0.5 12/22/2022    PTT 28.4 12/22/2022    INR 0.96 12/22/2022       Cancer Staging (if applicable)  Cancer Patient: __ yes __no __unknown__N/A; If yes, clinical stage T:__ N:__M:__, stage group or __N/A  Impression: Arthrofibrosis of the left knee  Degenerative joint disease  Status post left total knee  replacement    REAL Abarca   03/20/23   12:49 PM EDT

## 2023-03-20 NOTE — BRIEF OP NOTE
KNEE MANIPULATION  Progress Note    Елена Sellers  3/20/2023    Pre-op Diagnosis:   Arthrofibrosis of knee joint, left [M24.662]       Post-Op Diagnosis Codes:     * Arthrofibrosis of knee joint, left [M24.662]    Procedure/CPT® Codes:  TN MANIPULATION KNEE JOINT UNDER GENERAL ANESTHESIA [44004]      Procedure(s):  KNEE MANIPULATION        Surgeon(s):  Jesse Martines MD    Anesthesia: Choice    Staff:   Circulator: Erik Hansen RN  Scrub Person: Fina Champion  Nursing Assistant: Nicho Hopper PCT         Estimated Blood Loss: none    Urine Voided: * No values recorded between 3/20/2023  2:27 PM and 3/20/2023  2:43 PM *    Specimens:                None          Drains: * No LDAs found *    Findings: Arthrofibrosis left knee        Complications: None apparent          Jesse Martines MD     Date: 3/20/2023  Time: 14:50 EDT

## 2023-03-20 NOTE — OP NOTE
OPERATIVE REPORT     DATE OF PROCEDURE: 3/20/2023    SURGEON: Jesse Martines M.D.     ASSISTANT(S): Circulator: Erik Hansen RN  Scrub Person: Fina Champion  Nursing Assistant: Nicho Hopper PCT     PREOPERATIVE DIAGNOSIS: Left knee arthrofibrosis    POSTOPERATIVE DIAGNOSIS: same     PROCEDURE: Left knee manipulation under anesthesia    SURGICAL DETAILS:     APPROACH: Not applicable    ANESTHESIA: Conscious sedation with propofol    PREOPERATIVE ANTIBIOTICS: None    ESTIMATED BLOOD LOSS: 0 cc     TOURNIQUET TIME: 0 min    SPECIMENS: None    IMPLANTS: None    DRAINS: None    LOCAL INJECTION: None    COMPLICATIONS: None apparent    INDICATIONS FOR PROCEDURE: Patient is a 52-year-old female who underwent total knee arthroplasty approximately 2-1/2 months ago.  She return to clinic last week complaining of difficulty obtaining knee flexion.  As a result, despite aggressive physical therapy, the recommendation was to proceed with manipulation under anesthesia to improve range of motion.  The risks, benefits, alternatives, and potential complications of the this surgery were discussed with the patient in detail to include but not limited to infection, bleeding, anesthesia risks, nerve injury, vessel injury, pain, blood clots, possible need for blood transfusion, possible need for further procedures, myocardial infarction, stroke, and death.  Specific risks for the surgery include iatrogenic fracture, extensor mechanism injury. Specific details of the procedure, hospitalization, recovery, rehabilitation, and long-term precautions were also provided. Pre-operative teaching was provided. Surgical device selection was outlined, and the many options available were explained; final choices will be made at the time of the procedure to match the anatomy and condition of the bone, and soft tissue structures. Understanding of all topics was conveyed to me by the patient, and consent was given to proceed with a left knee  manipulation under anesthesia.     INTRAOPERATIVE FINDINGS: Preoperative range of motion 0 to 70 degrees.  Postoperative range of motion 0 to 130 degrees.    PROCEDURE: The patient was identified in the preoperative holding area. The operative site was confirmed and marked. A sequential compression device was placed on the nonoperative leg. The risks, benefits, and alternatives to surgery were again confirmed with the patient and the patient wished to proceed. The patient was brought to the operating room. A huddle was performed with the patient and all vital surgical team members to confirm the correct operative site, procedure, anesthesia type, and operative plan with the patient. After anesthesia was performed, the patient was positioned in the supine position. A surgical time out was performed immediately preceding the incision with all personnel in the operating room to confirm patient identity, the correct operative site and extremity, correct radiographic studies, availability of appropriate surgical equipment and agreement on the planned procedure.     Preoperative assessment ensued which revealed the above preoperative range of motion.  Afterwards, gentle manipulation at the proximal tibia using manual pressure was performed to gently release adhesions in the suprapatellar region of the knee.  This resulted in gradual improvement in knee flexion.  Upon completion, range of motion was 0 to 130 degrees.  The knee remained stable to varus and valgus stress.  The patient was then awakened from anesthesia and was able to perform a straight leg raise without extensor lag.      Upon verifying this, the abductor canal catheter was placed by the anesthesia team.  The patient was fully awakened and transferred to the PACU in stable condition.    No apparent complications occurred during the procedure Instrument, sponge and needle counts were correct x 2.     POST OPERATIVE PLAN:   Weight Bearing: Protected  weightbearing as tolerated with knee range of motion as tolerated  Therapy/Activity: PT OT for mobilization and range of motion exercises  Pain control: Resume home narcotic medication  Social work for discharge planning   Follow up: Dr. Martines 2-weeks

## 2023-04-06 ENCOUNTER — OFFICE VISIT (OUTPATIENT)
Dept: ORTHOPEDIC SURGERY | Facility: CLINIC | Age: 53
End: 2023-04-06
Payer: COMMERCIAL

## 2023-04-06 VITALS — TEMPERATURE: 97.1 F

## 2023-04-06 DIAGNOSIS — Z96.652 S/P TOTAL KNEE ARTHROPLASTY, LEFT: Primary | ICD-10-CM

## 2023-04-06 PROCEDURE — 99024 POSTOP FOLLOW-UP VISIT: CPT

## 2023-04-06 NOTE — PROGRESS NOTES
Answers for HPI/ROS submitted by the patient on 3/30/2023  Please describe your symptoms.: Post op  Have you had these symptoms before?: No  How long have you been having these symptoms?: 1-2 weeks  What is the primary reason for your visit?: Other                                                                    [facility] Office Follow Up       Office Follow Up Visit       Patient Name: Елена Sellers    Chief Complaint:   Chief Complaint   Patient presents with   • Post-op     2 weeks post--Knee Manipulation Left 3/20/23--3 months Status post total left knee replacement; DOS 01.04.2023       Referring Physician: No ref. provider found    History of Present Illness:   Елена Sellers returns to clinic today for post-op visit s/p total knee arthroplasty 1/4/2023 with left knee manipulation 3/20/2023.  Now 2 weeks out from knee manipulation.  She reports significant improvement from prior to manipulation.  She has been working aggressively with physical therapy for increased range of motion.  She has been measured recently at 0-110.  She is able to walk now without assistive device.  Pain has been well controlled. Rates pain a 2/10. She has not taken any pain medications for over a week.      Subjective     Review of Systems     I have reviewed and updated the following portions of the patient's history and review of systems: allergies, current medications, past family history, past medical history, past social history, past surgical history and problem list.    Medications:   Current Outpatient Medications:   •  aspirin 81 MG EC tablet, Take 1 tablet by mouth Every 12 (Twelve) Hours for 1 month as directed for VTE Prophylaxis, Disp: 60 tablet, Rfl: 0  •  docusate sodium (Colace) 100 MG capsule, Take 1 capsule by mouth 2 (Two) Times a Day., Disp: 60 capsule, Rfl: 0  •  naproxen (NAPROSYN) 500 MG tablet, Take 1 tablet by mouth 2 (Two) Times a Day With Meals., Disp: 60 tablet, Rfl: 3  •   ondansetron (Zofran) 4 MG tablet, Take 1 tablet by mouth Every 8 (Eight) Hours As Needed for Nausea or Vomiting., Disp: 10 tablet, Rfl: 1  •  traMADol (ULTRAM) 50 MG tablet, Take 1 tablet by mouth Every 6 (Six) Hours As Needed for Moderate Pain., Disp: 40 tablet, Rfl: 0    Allergies:   Allergies   Allergen Reactions   • Oxycodone Nausea And Vomiting   • Codeine Nausea And Vomiting     As a child per pt          Objective      Vital Signs:   Vitals:    04/06/23 0816   Temp: 97.1 °F (36.2 °C)       Ortho Exam:  Knee Exam: LEFT  ----------  ALIGNMENT: neutral, no varus or valgus deformity     RANGE OF MOTION: 2-105  KNEE EFFUSION: no  PAIN WITH KNEE ROM: no    STRAIGHT LEG TEST:   negative    SENSATION TO LIGHT TOUCH:  DEEP PERONEAL/SUPERFICIAL PERONEAL/SURAL/SAPHENOUS/TIBIAL:   intact    EDEMA:  no  ERYTHEMA:  no  WOUNDS/INCISIONS: Healed incision without evidence of erythema or drainage      Results Review:  XR Knee 3+ View With [place] Left  Narrative: Indication: Status post left total knee arthroplasty    Comparison: Todays xrays were compared to previous xrays from 3/17/2023  Impression:      Left Knee: Demonstrate well positioned knee arthroplasty components in   satisfactory alignment without evidence of wear, loosening, subsidence,   fracture, or osteolysis and No significant changes compared to prior   radiographs.       XR Knee 1 or 2 View Left    Result Date: 1/4/2023  Impression: Status post total knee arthroplasty in near-anatomic alignment, with no evidence of immediate complication. Electronically Signed: Jg Monzon  1/4/2023 10:26 AM EST  Workstation ID: [id number]    XR Knee 3+ View With [place] Left    Result Date: 1/24/2023   Left Knee: Demonstrate well positioned knee arthroplasty components in satisfactory alignment without evidence of wear, loosening, subsidence, fracture, or osteolysis and No significant changes compared to prior radiographs.         Assessment / Plan      Assessment:    Diagnoses and all orders for this visit:    1. S/P total knee arthroplasty, left (Primary)  -     XR Knee 3+ View With [place] Left      X-ray films reviewed today.  Shows stable implants with well aligned components.  No evidence of loosening or fracture.    Overall significant improvement in pain and range of motion from prior to manipulation.    Plan:  1. 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  Continue to work on ambulation without assistive device.  2. May take anti-inflammatories as needed for pain and swelling.    Follow Up:   4 weeks with Dr. Martines      History, diagnosis and treatment plan discussed with Dr. Martines.      Darlyn Angeles PA-C  [facility] Orthopedic Surgery    Dictated using Dragon Speech Recognition.

## 2023-05-04 ENCOUNTER — OFFICE VISIT (OUTPATIENT)
Dept: ORTHOPEDIC SURGERY | Facility: CLINIC | Age: 53
End: 2023-05-04
Payer: COMMERCIAL

## 2023-05-04 VITALS
SYSTOLIC BLOOD PRESSURE: 148 MMHG | DIASTOLIC BLOOD PRESSURE: 88 MMHG | HEIGHT: 63 IN | WEIGHT: 195.2 LBS | BODY MASS INDEX: 34.59 KG/M2

## 2023-05-04 DIAGNOSIS — Z96.652 S/P TOTAL KNEE ARTHROPLASTY, LEFT: Primary | ICD-10-CM

## 2023-05-04 NOTE — PROGRESS NOTES
Orthopaedic Clinic Note:  Knee Post Op    Chief Complaint   Patient presents with   • Post-op     4 week f/u- 6 weeks s/p Knee Manipulation Left 3/20/23--4 months Status post total left knee replacement; DOS 01.04.2023        HPI    Ms. Sellers is 6  week(s) s/p left knee manipulation under anesthesia.  She is 4 months out from left knee arthroplasty.  Rates her pain 1/10 on the pain scale.  She is ambulating with no assistive device.  Denies fevers chills or constitutional symptoms.  Overall she is doing better.    Past Medical History:   Diagnosis Date   • COVID 01/2022    mild- cough and joint pain only   • Knee swelling 03/22   • Osgood-Schlatter's disease 1986   • Osteoarthritis     LEFT KNEE   • PONV (postoperative nausea and vomiting)       Past Surgical History:   Procedure Laterality Date   • BREAST SURGERY  2017    reduction   • HYSTERECTOMY  2008   • KNEE JOINT MANIPULATION Left 03/20/2023    Procedure: KNEE MANIPULATION LEFT;  Surgeon: Jesse Martines MD;  Location:  [facility];  Service: Orthopedics;  Laterality: Left;   • KNEE SURGERY Left     torn meniscus 1986, patellar dislocation 1988   • TONSILLECTOMY  1974   • TOTAL KNEE ARTHROPLASTY Left 01/04/2023    Procedure: TOTAL KNEE ARTHROPLASTY - LEFT;  Surgeon: Jesse Martines MD;  Location:  [facility];  Service: Orthopedics;  Laterality: Left;   • TUBAL ABDOMINAL LIGATION  2002   • WISDOM TOOTH EXTRACTION       Family History   Problem Relation Age of Onset   • Arthritis Mother    • COPD Mother    • Thyroid disease Mother    • Obesity Mother    • Hypertension Mother    • Breast cancer Mother         bilat mastectomy   • Anesthesia problems Mother    • Cancer Mother    • Rheumatologic disease Mother    • Obesity Father       Social History     Socioeconomic History   • Marital status:    Tobacco Use   • Smoking status: Never   • Smokeless tobacco: Never   Vaping Use   • Vaping Use: Never used   Substance and Sexual Activity   • Alcohol use:  "Yes     Alcohol/week: 4.0 standard drinks     Types: 4 Glasses of wine per week     Comment: 4 GLASSES/WEEK   • Drug use: Never   • Sexual activity: Yes     Partners: Male     Birth control/protection: None, Tubal ligation     Comment: Hysterectomy      Current Outpatient Medications on File Prior to Visit   Medication Sig Dispense Refill   • naproxen (NAPROSYN) 500 MG tablet Take 1 tablet by mouth 2 (Two) Times a Day With Meals. 60 tablet 3   • [DISCONTINUED] aspirin 81 MG EC tablet Take 1 tablet by mouth Every 12 (Twelve) Hours for 1 month as directed for VTE Prophylaxis 60 tablet 0   • [DISCONTINUED] docusate sodium (Colace) 100 MG capsule Take 1 capsule by mouth 2 (Two) Times a Day. 60 capsule 0   • [DISCONTINUED] ondansetron (Zofran) 4 MG tablet Take 1 tablet by mouth Every 8 (Eight) Hours As Needed for Nausea or Vomiting. 10 tablet 1   • [DISCONTINUED] traMADol (ULTRAM) 50 MG tablet Take 1 tablet by mouth Every 6 (Six) Hours As Needed for Moderate Pain. 40 tablet 0     No current facility-administered medications on file prior to visit.      Allergies   Allergen Reactions   • Oxycodone Nausea And Vomiting   • Codeine Nausea And Vomiting     As a child per pt         Review of Systems   Constitutional: Negative.    HENT: Negative.    Eyes: Negative.    Respiratory: Negative.    Cardiovascular: Negative.    Gastrointestinal: Negative.    Endocrine: Negative.    Genitourinary: Negative.    Musculoskeletal: Positive for arthralgias.   Skin: Negative.    Allergic/Immunologic: Negative.    Neurological: Negative.    Hematological: Negative.    Psychiatric/Behavioral: Negative.         Physical Exam  Blood pressure 148/88, height 160 cm (62.99\"), weight 88.5 kg (195 lb 3.2 oz), not currently breastfeeding.    Body mass index is 34.59 kg/m².    GENERAL APPEARANCE: awake, alert, oriented, in no acute distress and well developed, well nourished  LUNGS:  breathing nonlabored  EXTREMITIES: no clubbing, " cyanosis  PERIPHERAL PULSES: palpable dorsalis pedis and posterior tibial pulses bilaterally.    GAIT:  Normal          Left Knee Exam:  ----------  ALIGNMENT: neutral  ----------  RANGE OF MOTION:  Decreased (0 - 115 degrees) with no extensor lag  LIGAMENTOUS STABILITY:   stable to varus and valgus stress at terminal extension and 30 degrees without any evidence of laxity  ----------  STRENGTH:  KNEE FLEXION 5/5  KNEE EXTENSION  5/5  ANKLE DORSIFLEXION  5/5  ANKLE PLANTARFLEXION  5/5  ----------  PAIN WITH PALPATION:denies tenderness to palpation about the knee  KNEE EFFUSION: no  PAIN WITH KNEE ROM: no  PATELLAR CREPITUS:  no  ----------  SENSATION TO LIGHT TOUCH:  DEEP PERONEAL/SUPERFICIAL PERONEAL/SURAL/SAPHENOUS/TIBIAL:    intact  ----------  EDEMA:  no  ERYTHEMA:    no  WOUNDS/INCISIONS:   yes, well healed surgical incision without evidence of erythema or drainage  _____________________________________________________________________  _____________________________________________________________________    RADIOGRAPHIC FINDINGS:   Indication: Status post left total knee arthroplasty    Comparison: Todays xrays were compared to previous xrays from 4/6/2023    Knee films: Demonstrate well positioned knee arthroplasty components in satisfactory alignment without evidence of wear, loosening, subsidence, fracture, or osteolysis and No significant changes compared to prior radiographs.       Assessment/Plan:   Diagnosis Plan   1. S/P total knee arthroplasty, left  XR Knee 3+ View With [place] Left        Patient is doing well 4-month status post left total knee arthroplasty and 6-week status postmanipulation under anesthesia.  She has maintain range of motion.  Encouraged her to continue working on stretching and maintaining her range of motion.  I will see her back in 2 months for repeat evaluation and range of motion assessment.    Jesse Martines MD  05/04/23  08:44 EDT

## 2024-01-09 ENCOUNTER — OFFICE VISIT (OUTPATIENT)
Dept: ORTHOPEDIC SURGERY | Facility: CLINIC | Age: 54
End: 2024-01-09
Payer: COMMERCIAL

## 2024-01-09 VITALS
WEIGHT: 198.8 LBS | HEIGHT: 64 IN | DIASTOLIC BLOOD PRESSURE: 98 MMHG | SYSTOLIC BLOOD PRESSURE: 162 MMHG | BODY MASS INDEX: 33.94 KG/M2

## 2024-01-09 DIAGNOSIS — Z96.652 S/P TOTAL KNEE ARTHROPLASTY, LEFT: Primary | ICD-10-CM

## 2024-01-09 PROCEDURE — 99213 OFFICE O/P EST LOW 20 MIN: CPT | Performed by: ORTHOPAEDIC SURGERY

## 2024-01-09 NOTE — PROGRESS NOTES
Orthopaedic Clinic Note: Knee Established Patient    Chief Complaint   Patient presents with    Follow-up     6 month follow up - 1 year S/P total knee arthroplasty, left (DOS: 1/4/23), 9 months left knee manipulation (3/20/23)        HPI    It has been 6  month(s) since Ms. Sellers's last visit. She returns to clinic today for follow-up left total knee arthroplasty.  Patient is 1 year status post total knee arthroplasty.  She rates her pain 0/10 on the pain scale.  She is ambulating with no assistive device.  Denies fevers chills or constitutional symptoms.  Overall she is happy with her outcome.    Past Medical History:   Diagnosis Date    COVID 01/2022    mild- cough and joint pain only    Knee swelling 03/22    Osgood-Schlatter's disease 1986    Osteoarthritis     LEFT KNEE    PONV (postoperative nausea and vomiting)       Past Surgical History:   Procedure Laterality Date    BREAST SURGERY  2017    reduction    HYSTERECTOMY  2008    KNEE JOINT MANIPULATION Left 03/20/2023    Procedure: KNEE MANIPULATION LEFT;  Surgeon: Jesse Martines MD;  Location:  [facility];  Service: Orthopedics;  Laterality: Left;    KNEE SURGERY Left     torn meniscus 1986, patellar dislocation 1988    TONSILLECTOMY  1974    TOTAL KNEE ARTHROPLASTY Left 01/04/2023    Procedure: TOTAL KNEE ARTHROPLASTY - LEFT;  Surgeon: Jesse Martines MD;  Location:  [facility];  Service: Orthopedics;  Laterality: Left;    TUBAL ABDOMINAL LIGATION  2002    WISDOM TOOTH EXTRACTION        Family History   Problem Relation Age of Onset    Arthritis Mother     COPD Mother     Thyroid disease Mother     Obesity Mother     Hypertension Mother     Breast cancer Mother         bilat mastectomy    Anesthesia problems Mother     Cancer Mother     Rheumatologic disease Mother     Obesity Father     Cancer Father      Social History     Socioeconomic History    Marital status:    Tobacco Use    Smoking status: Never    Smokeless tobacco: Never   Vaping Use  "   Vaping Use: Never used   Substance and Sexual Activity    Alcohol use: Yes     Alcohol/week: 4.0 standard drinks of alcohol     Types: 4 Glasses of wine per week     Comment: 4 GLASSES/WEEK    Drug use: Never    Sexual activity: Yes     Partners: Male     Birth control/protection: None, Tubal ligation     Comment: Hysterectomy      Current Outpatient Medications on File Prior to Visit   Medication Sig Dispense Refill    naproxen (NAPROSYN) 500 MG tablet Take 1 tablet by mouth 2 (Two) Times a Day With Meals. 60 tablet 3     No current facility-administered medications on file prior to visit.      Allergies   Allergen Reactions    Oxycodone Nausea And Vomiting    Codeine Nausea And Vomiting     As a child per pt         Review of Systems   Constitutional: Negative.    HENT: Negative.     Eyes: Negative.    Respiratory: Negative.     Cardiovascular: Negative.    Gastrointestinal: Negative.    Endocrine: Negative.    Genitourinary: Negative.    Musculoskeletal:  Positive for arthralgias.   Skin: Negative.    Allergic/Immunologic: Negative.    Neurological: Negative.    Hematological: Negative.    Psychiatric/Behavioral: Negative.          The patient's Review of Systems was personally reviewed and confirmed as accurate.    Physical Exam  Blood pressure 162/98, height 162.6 cm (64.02\"), weight 90.2 kg (198 lb 12.8 oz), not currently breastfeeding.    Body mass index is 34.11 kg/m².    GENERAL APPEARANCE: awake, alert, oriented, in no acute distress and well developed, well nourished  LUNGS:  breathing nonlabored  EXTREMITIES: no clubbing, cyanosis  PERIPHERAL PULSES: palpable dorsalis pedis and posterior tibial pulses bilaterally.    GAIT:  Normal        ----------  Left Knee Exam:  ----------  ALIGNMENT: neutral  ----------  RANGE OF MOTION:  Normal (0-120 degrees) with no extensor lag or flexion contracture  LIGAMENTOUS STABILITY:   stable to varus and valgus stress at terminal extension and 30 degrees without any " evidence of laxity  ----------  STRENGTH:  KNEE FLEXION 5/5  KNEE EXTENSION  5/5  ANKLE DORSIFLEXION  5/5  ANKLE PLANTARFLEXION  5/5  ----------  PAIN WITH PALPATION:denies tenderness to palpation about the knee  KNEE EFFUSION: no  PAIN WITH KNEE ROM: no  PATELLAR CREPITUS:  no  ----------  SENSATION TO LIGHT TOUCH:  DEEP PERONEAL/SUPERFICIAL PERONEAL/SURAL/SAPHENOUS/TIBIAL:    intact  ----------  EDEMA:  no  ERYTHEMA:    no  WOUNDS/INCISIONS:   yes, well healed surgical incision without evidence of erythema or drainage  _____________________________________________________________________  _____________________________________________________________________    RADIOGRAPHIC FINDINGS:   Indication: Status post left total knee arthroplasty    Comparison: Todays xrays were compared to previous xrays from 5/4/2023    Knee films: Demonstrate well positioned knee arthroplasty components in satisfactory alignment without evidence of wear, loosening, subsidence, fracture, or osteolysis and No significant changes compared to prior radiographs.    Assessment/Plan:   Diagnosis Plan   1. S/P total knee arthroplasty, left  XR Knee 3+ View With [place] Left        Patient is doing well 1 year status post left total knee arthroplasty.  I recommend activity as tolerated without restrictions.  I will see the patient back in 5 years for repeat assessment x-ray 3 views left knee on return.  Patient is welcome to follow-up sooner should problems arise.    I recommend prophylactic antibiotics prior to invasive procedures indefinitely to minimize risk of prosthetic joint infection.  Amoxicillin 2 g orally 1 hour prior to procedure is recommended.        Yesica Escamilla MA  01/09/24  13:52 EST

## 2024-05-16 NOTE — OUTREACH NOTE
Daughter called main line and left a message to call patient back at [phone], or [phone]. Unsuccessful attempts to reach patient. Daughter concerned as patient was having a reaction to oxycodone.   Yes

## 2024-07-16 ENCOUNTER — OFFICE VISIT (OUTPATIENT)
Dept: INTERNAL MEDICINE | Facility: CLINIC | Age: 54
End: 2024-07-16
Payer: COMMERCIAL

## 2024-07-16 ENCOUNTER — LAB (OUTPATIENT)
Dept: INTERNAL MEDICINE | Facility: CLINIC | Age: 54
End: 2024-07-16
Payer: COMMERCIAL

## 2024-07-16 VITALS
BODY MASS INDEX: 36.09 KG/M2 | TEMPERATURE: 98.2 F | OXYGEN SATURATION: 96 % | WEIGHT: 211.4 LBS | HEIGHT: 64 IN | RESPIRATION RATE: 16 BRPM | DIASTOLIC BLOOD PRESSURE: 86 MMHG | HEART RATE: 113 BPM | SYSTOLIC BLOOD PRESSURE: 128 MMHG

## 2024-07-16 DIAGNOSIS — F43.9 STRESS: ICD-10-CM

## 2024-07-16 DIAGNOSIS — Z00.00 ANNUAL PHYSICAL EXAM: Primary | ICD-10-CM

## 2024-07-16 DIAGNOSIS — Z00.00 ANNUAL PHYSICAL EXAM: ICD-10-CM

## 2024-07-16 DIAGNOSIS — F41.9 ANXIETY: ICD-10-CM

## 2024-07-16 PROCEDURE — 36415 COLL VENOUS BLD VENIPUNCTURE: CPT | Performed by: NURSE PRACTITIONER

## 2024-07-16 PROCEDURE — 80061 LIPID PANEL: CPT | Performed by: NURSE PRACTITIONER

## 2024-07-16 PROCEDURE — 80050 GENERAL HEALTH PANEL: CPT | Performed by: NURSE PRACTITIONER

## 2024-07-16 PROCEDURE — 99396 PREV VISIT EST AGE 40-64: CPT | Performed by: NURSE PRACTITIONER

## 2024-07-16 PROCEDURE — 83036 HEMOGLOBIN GLYCOSYLATED A1C: CPT | Performed by: NURSE PRACTITIONER

## 2024-07-16 RX ORDER — BUPROPION HYDROCHLORIDE 150 MG/1
150 TABLET ORAL DAILY
Qty: 30 TABLET | Refills: 1 | Status: SHIPPED | OUTPATIENT
Start: 2024-07-16

## 2024-07-16 NOTE — PROGRESS NOTES
Patient Care Team:  Nisreen Rosas APRN as PCP - General (Nurse Practitioner)     Chief Complaint   Patient presents with    Annual Exam     Patient states everything is going good, no questions or concerns.                Patient is a 54 y.o. female who presents for her yearly physical exam.     HPI   History of Present Illness  The patient is a 54-year-old female who is here for annual exam. She has no acute complaints today.    The patient is here for her annual examination, primarily for blood work. She reports slightly elevated blood pressure, which she attributes to stress due to her 's illness. Despite not experiencing depression, she acknowledges feelings of anxiety and stress. She has previously taken Wellbutrin, which she found beneficial, but expresses a dislike for taking medication. Post knee surgery, she experienced a weight loss down to  188 pounds, but has since regained this weight. She maintains an active lifestyle, including cycling, walking, and utilizing vibration plates for leg, knee, and blood circulation. She experiences foot swelling after prolonged sitting. Her last dental check-up was several years ago. She occasionally applies sunscreen when outdoors. She denies any skin concerns. She performs self-breast exams and underwent a mammogram at [facility] in 03/2023 or 04/2023, which yielded normal results. She has never received an influenza vaccine. She had a negative Cologuard test in 2022.   She does not smoke or use tobacco products. She drinks 4 glasses of wine a week.   Her mother had bilateral breast cancer.        Health maintenance/lifestyle:  Health Maintenance   Topic Date Due    MAMMOGRAM  Never done    ZOSTER VACCINE (1 of 2) Never done    HEPATITIS C SCREENING  Never done    ANNUAL PHYSICAL  Never done    COVID-19 Vaccine (4 - 2023-24 season) 09/01/2023    INFLUENZA VACCINE  08/01/2024    COLORECTAL CANCER SCREENING  03/04/2025    BMI FOLLOWUP   07/16/2025    TDAP/TD VACCINES (2 - Td or Tdap) 01/01/2026    Pneumococcal Vaccine 0-64  Aged Out     Immunization History   Administered Date(s) Administered    COVID-19 (PFIZER) Purple Cap Monovalent 05/09/2021, 05/30/2021, 12/06/2021    Influenza, Unspecified 12/06/2021    Tdap 01/01/2016      reports being sexually active and has had partner(s) who are male. She reports using the following methods of birth control/protection: None and Tubal ligation.  Social History     Tobacco Use   Smoking Status Never    Passive exposure: Never   Smokeless Tobacco Never     Social History     Substance and Sexual Activity   Alcohol Use Yes    Alcohol/week: 4.0 standard drinks of alcohol    Types: 4 Glasses of wine per week    Comment: 4 GLASSES/WEEK       Results         PHQ-2 Depression Screening  Little interest or pleasure in doing things? 0-->not at all   Feeling down, depressed, or hopeless? 0-->not at all   PHQ-2 Total Score 0           History  Social History     Socioeconomic History    Marital status:    Tobacco Use    Smoking status: Never     Passive exposure: Never    Smokeless tobacco: Never   Vaping Use    Vaping status: Never Used   Substance and Sexual Activity    Alcohol use: Yes     Alcohol/week: 4.0 standard drinks of alcohol     Types: 4 Glasses of wine per week     Comment: 4 GLASSES/WEEK    Drug use: Never    Sexual activity: Yes     Partners: Male     Birth control/protection: None, Tubal ligation     Comment: Hysterectomy     Past Medical History:   Diagnosis Date    COVID 01/2022    mild- cough and joint pain only    Knee swelling 03/22    Osgood-Schlatter's disease 1986    Osteoarthritis     LEFT KNEE    PONV (postoperative nausea and vomiting)       Past Surgical History:   Procedure Laterality Date    BREAST SURGERY  2017    reduction    HYSTERECTOMY  2008    KNEE JOINT MANIPULATION Left 03/20/2023    Procedure: KNEE MANIPULATION LEFT;  Surgeon: Jesse Martines MD;  Location: [facility] OR;   "Service: Orthopedics;  Laterality: Left;    KNEE SURGERY Left     torn meniscus 1986, patellar dislocation 1988    TONSILLECTOMY  1974    TOTAL KNEE ARTHROPLASTY Left 01/04/2023    Procedure: TOTAL KNEE ARTHROPLASTY - LEFT;  Surgeon: Jesse Martines MD;  Location: [facility];  Service: Orthopedics;  Laterality: Left;    TUBAL ABDOMINAL LIGATION  2002    WISDOM TOOTH EXTRACTION        Allergies   Allergen Reactions    Oxycodone Nausea And Vomiting    Codeine Nausea And Vomiting     As a child per pt       Family History   Problem Relation Age of Onset    Arthritis Mother     COPD Mother     Thyroid disease Mother     Obesity Mother     Hypertension Mother     Breast cancer Mother         bilat mastectomy    Anesthesia problems Mother     Cancer Mother     Rheumatologic disease Mother     Obesity Father     Cancer Father        Current Outpatient Medications:     buPROPion XL (Wellbutrin XL) 150 MG 24 hr tablet, Take 1 tablet by mouth Daily., Disp: 30 tablet, Rfl: 1    naproxen (NAPROSYN) 500 MG tablet, Take 1 tablet by mouth 2 (Two) Times a Day With Meals. (Patient not taking: Reported on 7/16/2024), Disp: 60 tablet, Rfl: 3                  /86 (BP Location: Right arm, Patient Position: Sitting, Cuff Size: Adult)   Pulse 113   Temp 98.2 °F (36.8 °C) (Temporal)   Resp 16   Ht 162.6 cm (64.02\")   Wt 95.9 kg (211 lb 6.4 oz)   SpO2 96%   BMI 36.27 kg/m²     Physical Exam  Vitals and nursing note reviewed.   Constitutional:       General: She is not in acute distress.     Appearance: Normal appearance. She is well-developed. She is not ill-appearing or diaphoretic.   HENT:      Head: Normocephalic and atraumatic.   Eyes:      General: No scleral icterus.     Conjunctiva/sclera: Conjunctivae normal.   Neck:      Vascular: No JVD.   Cardiovascular:      Rate and Rhythm: Normal rate and regular rhythm.      Chest Wall: PMI is not displaced. No thrill.      Heart sounds: Normal heart sounds. No murmur " heard.  Pulmonary:      Effort: Pulmonary effort is normal. No accessory muscle usage or respiratory distress.      Breath sounds: Normal breath sounds.   Chest:      Chest wall: No tenderness.   Abdominal:      General: Bowel sounds are normal. There is no distension.      Palpations: Abdomen is soft.      Tenderness: There is no abdominal tenderness.   Musculoskeletal:         General: Normal range of motion.      Cervical back: Normal range of motion.      Right lower leg: No edema.      Left lower leg: No edema.   Skin:     General: Skin is warm and dry.      Coloration: Skin is not ashen, jaundiced, mottled or pale.      Findings: No erythema.   Neurological:      General: No focal deficit present.      Mental Status: She is alert and oriented to person, place, and time.   Psychiatric:         Attention and Perception: Attention normal.         Mood and Affect: Mood and affect normal.         Behavior: Behavior normal. Behavior is cooperative.         Thought Content: Thought content normal.         Cognition and Memory: Cognition normal.         Judgment: Judgment normal.                  Diagnoses and all orders for this visit:    1. Annual physical exam (Primary)  -     CBC (No Diff); Future  -     Comprehensive Metabolic Panel; Future  -     Lipid Panel; Future  -     Hemoglobin A1c; Future  -     TSH Rfx On Abnormal To Free T4; Future    2. Anxiety  -     buPROPion XL (Wellbutrin XL) 150 MG 24 hr tablet; Take 1 tablet by mouth Daily.  Dispense: 30 tablet; Refill: 1    3. Stress  -     buPROPion XL (Wellbutrin XL) 150 MG 24 hr tablet; Take 1 tablet by mouth Daily.  Dispense: 30 tablet; Refill: 1        Assessment & Plan    The patient's blood pressure is within the normal range. A prescription for Wellbutrin 150 mg extended-release tablet, to be taken in the morning, has been issued. Additionally, routine lab work, including CBC, CMP, lipid, A1c, and TSH, will be conducted.    Follow-up  A follow-up  appointment is scheduled for 4 to 6 weeks from now.         Labs  ordered as above- will notify of results and treat accordingly. If patient has not received results within one week via mychart or letter, they will notify my office  Immunizations and screenings:   Other preventative/screenings are up-to-date/addressed as noted in the HPI.  Counseling: The patient is advised to attempt to lose weight      Follow up: Return in about 4 weeks (around 8/13/2024) for Recheck, and need to collect labs today.  Plan of care discussed with patient. They verbalized understanding and agreement.     Electronically signed by : SELIN Hartman    7/21/2024   09:44 EDT           Patient or patient representative verbalized consent for the use of Ambient Listening during the visit with  SELIN Hartman for chart documentation. 7/21/2024  09:45 EDT

## 2024-07-17 LAB
ALBUMIN SERPL-MCNC: 4.5 G/DL (ref 3.5–5.2)
ALBUMIN/GLOB SERPL: 1.5 G/DL
ALP SERPL-CCNC: 66 U/L (ref 39–117)
ALT SERPL W P-5'-P-CCNC: 19 U/L (ref 1–33)
ANION GAP SERPL CALCULATED.3IONS-SCNC: 11 MMOL/L (ref 5–15)
AST SERPL-CCNC: 24 U/L (ref 1–32)
BILIRUB SERPL-MCNC: 0.4 MG/DL (ref 0–1.2)
BUN SERPL-MCNC: 11 MG/DL (ref 6–20)
BUN/CREAT SERPL: 15.5 (ref 7–25)
CALCIUM SPEC-SCNC: 9.5 MG/DL (ref 8.6–10.5)
CHLORIDE SERPL-SCNC: 104 MMOL/L (ref 98–107)
CHOLEST SERPL-MCNC: 236 MG/DL (ref 0–200)
CO2 SERPL-SCNC: 24 MMOL/L (ref 22–29)
CREAT SERPL-MCNC: 0.71 MG/DL (ref 0.57–1)
DEPRECATED RDW RBC AUTO: 41.8 FL (ref 37–54)
EGFRCR SERPLBLD CKD-EPI 2021: 101.8 ML/MIN/1.73
ERYTHROCYTE [DISTWIDTH] IN BLOOD BY AUTOMATED COUNT: 13.4 % (ref 12.3–15.4)
GLOBULIN UR ELPH-MCNC: 3 GM/DL
GLUCOSE SERPL-MCNC: 83 MG/DL (ref 65–99)
HBA1C MFR BLD: 5.1 % (ref 4.8–5.6)
HCT VFR BLD AUTO: 41.2 % (ref 34–46.6)
HDLC SERPL-MCNC: 64 MG/DL (ref 40–60)
HGB BLD-MCNC: 13.4 G/DL (ref 12–15.9)
LDLC SERPL CALC-MCNC: 150 MG/DL (ref 0–100)
LDLC/HDLC SERPL: 2.29 {RATIO}
MCH RBC QN AUTO: 28.5 PG (ref 26.6–33)
MCHC RBC AUTO-ENTMCNC: 32.5 G/DL (ref 31.5–35.7)
MCV RBC AUTO: 87.5 FL (ref 79–97)
PLATELET # BLD AUTO: 263 10*3/MM3 (ref 140–450)
PMV BLD AUTO: 11.3 FL (ref 6–12)
POTASSIUM SERPL-SCNC: 4.6 MMOL/L (ref 3.5–5.2)
PROT SERPL-MCNC: 7.5 G/DL (ref 6–8.5)
RBC # BLD AUTO: 4.71 10*6/MM3 (ref 3.77–5.28)
SODIUM SERPL-SCNC: 139 MMOL/L (ref 136–145)
TRIGL SERPL-MCNC: 126 MG/DL (ref 0–150)
TSH SERPL DL<=0.05 MIU/L-ACNC: 1.97 UIU/ML (ref 0.27–4.2)
VLDLC SERPL-MCNC: 22 MG/DL (ref 5–40)
WBC NRBC COR # BLD AUTO: 7.28 10*3/MM3 (ref 3.4–10.8)

## 2024-07-29 ENCOUNTER — TELEPHONE (OUTPATIENT)
Dept: INTERNAL MEDICINE | Facility: CLINIC | Age: 54
End: 2024-07-29
Payer: COMMERCIAL

## 2024-07-29 DIAGNOSIS — Z12.31 ENCOUNTER FOR SCREENING MAMMOGRAM FOR MALIGNANT NEOPLASM OF BREAST: Primary | ICD-10-CM

## 2024-07-29 NOTE — TELEPHONE ENCOUNTER
I called [facility] medical Records and spoke with Nicol.  She is going to fax over the mammogram report from last year.

## 2024-07-29 NOTE — TELEPHONE ENCOUNTER
----- Message from [employer] sent at 7/29/2024  1:14 PM EDT -----  Regarding: mammogram  Contact: [phone]  It may have been last year August 31 I believe

## 2024-08-06 ENCOUNTER — OFFICE VISIT (OUTPATIENT)
Dept: INTERNAL MEDICINE | Facility: CLINIC | Age: 54
End: 2024-08-06
Payer: COMMERCIAL

## 2024-08-06 VITALS
SYSTOLIC BLOOD PRESSURE: 126 MMHG | TEMPERATURE: 98 F | WEIGHT: 211.6 LBS | HEART RATE: 68 BPM | DIASTOLIC BLOOD PRESSURE: 86 MMHG | HEIGHT: 64 IN | OXYGEN SATURATION: 97 % | BODY MASS INDEX: 36.12 KG/M2 | RESPIRATION RATE: 18 BRPM

## 2024-08-06 DIAGNOSIS — F41.9 ANXIETY: ICD-10-CM

## 2024-08-06 DIAGNOSIS — F43.9 STRESS: ICD-10-CM

## 2024-08-06 PROCEDURE — 99213 OFFICE O/P EST LOW 20 MIN: CPT | Performed by: NURSE PRACTITIONER

## 2024-08-06 RX ORDER — BUPROPION HYDROCHLORIDE 300 MG/1
300 TABLET ORAL DAILY
Qty: 30 TABLET | Refills: 3 | Status: SHIPPED | OUTPATIENT
Start: 2024-08-06

## 2024-08-08 NOTE — PROGRESS NOTES
Answers submitted by the patient for this visit:  Other (Submitted on 8/5/2024)  Please describe your symptoms.: Follow up  Have you had these symptoms before?: No  How long have you been having these symptoms?: 1-2 weeks  Primary Reason for Visit (Submitted on 8/5/2024)  What is the primary reason for your visit?: Other  Subjective   Елена Sellers is a 54 y.o. female.     Chief Complaint   Patient presents with    Anxiety    Stress     Pt would like to discuss increasing strength of buPROPion, tolerating okay but not where she would like to be        PCP: Nisreen Rosas APRN    Anxiety     /Review of systems    Jasmine is a 54 yr old pleasant female who is here to follow up on anxiety and stress. At her last appt we started her on wellbutrin. She has tolerated well and feels like it helps some but feels like it could help more. No HI/SI. Denies depression. She is requesting to increase dose.     Results      Lab Results   Component Value Date    WBC 7.28 07/16/2024    HGB 13.4 07/16/2024    HCT 41.2 07/16/2024    MCV 87.5 07/16/2024     07/16/2024     Lab Results   Component Value Date    GLUCOSE 83 07/16/2024    BUN 11 07/16/2024    CREATININE 0.71 07/16/2024    EGFR 101.8 07/16/2024    BCR 15.5 07/16/2024    K 4.6 07/16/2024    CO2 24.0 07/16/2024    CALCIUM 9.5 07/16/2024    ALBUMIN 4.5 07/16/2024    BILITOT 0.4 07/16/2024    AST 24 07/16/2024    ALT 19 07/16/2024     Lab Results   Component Value Date    CHOL 236 (H) 07/16/2024    TRIG 126 07/16/2024    HDL 64 (H) 07/16/2024     (H) 07/16/2024     Lab Results   Component Value Date    TSH 1.970 07/16/2024     A1C Last 3 Results          7/16/2024    12:12   HGBA1C Last 3 Results   Hemoglobin A1C 5.10        The following portions of the patient's history were reviewed and updated as appropriate: allergies, current medications, past family history, past medical history, past social history, past surgical history and problem  "list.              Outpatient Medications Marked as Taking for the 8/6/24 encounter (Office Visit) with Nisreen Rosas APRN   Medication Sig Dispense Refill    buPROPion XL (Wellbutrin XL) 300 MG 24 hr tablet Take 1 tablet by mouth Daily. 30 tablet 3    [DISCONTINUED] buPROPion XL (Wellbutrin XL) 150 MG 24 hr tablet Take 1 tablet by mouth Daily. 30 tablet 1     Allergies   Allergen Reactions    Oxycodone Nausea And Vomiting    Codeine Nausea And Vomiting     As a child per pt            Objective     Vitals:    08/06/24 1404   BP: 126/86   BP Location: Left arm   Patient Position: Sitting   Cuff Size: Adult   Pulse: 68   Resp: 18   Temp: 98 °F (36.7 °C)   TempSrc: Infrared   SpO2: 97%   Weight: 96 kg (211 lb 9.6 oz)   Height: 162.6 cm (64.02\")     Body mass index is 36.3 kg/m².  Wt Readings from Last 3 Encounters:   08/06/24 96 kg (211 lb 9.6 oz)   07/16/24 95.9 kg (211 lb 6.4 oz)   01/09/24 90.2 kg (198 lb 12.8 oz)     Physical Exam  Constitutional:       Appearance: Normal appearance. She is well-developed.   HENT:      Head: Normocephalic and atraumatic.   Eyes:      General: No scleral icterus.     Conjunctiva/sclera: Conjunctivae normal.   Cardiovascular:      Rate and Rhythm: Normal rate and regular rhythm.      Heart sounds: Normal heart sounds.   Pulmonary:      Effort: Pulmonary effort is normal. No respiratory distress.      Breath sounds: Normal breath sounds.   Abdominal:      General: Bowel sounds are normal.      Palpations: Abdomen is soft.      Tenderness: There is no abdominal tenderness.   Musculoskeletal:         General: Normal range of motion.      Cervical back: Normal range of motion.      Right lower leg: No edema.      Left lower leg: No edema.   Skin:     General: Skin is warm and dry.   Neurological:      General: No focal deficit present.      Mental Status: She is alert and oriented to person, place, and time.   Psychiatric:         Attention and Perception: Attention and perception " normal.         Mood and Affect: Mood and affect normal.         Behavior: Behavior normal. Behavior is cooperative.         Thought Content: Thought content normal.         Cognition and Memory: Cognition normal.         Judgment: Judgment normal.                   Assessment & Plan   Diagnoses and all orders for this visit:    1. Anxiety  -     buPROPion XL (Wellbutrin XL) 300 MG 24 hr tablet; Take 1 tablet by mouth Daily.  Dispense: 30 tablet; Refill: 3    2. Stress  -     buPROPion XL (Wellbutrin XL) 300 MG 24 hr tablet; Take 1 tablet by mouth Daily.  Dispense: 30 tablet; Refill: 3      Feels like improving. Desires to increase dose. Increase wellbutrin  to 300 mg           Return in about 6 weeks (around 9/17/2024) for Recheck.    I discussed my findings,recommendations, and plan of care was with the patient. They verbalized understanding and agreement.  Patient was encouraged to keep me informed of any acute changes, lack of improvement, or any new concerning symptoms.

## 2024-12-10 DIAGNOSIS — F41.9 ANXIETY: ICD-10-CM

## 2024-12-10 DIAGNOSIS — F43.9 STRESS: ICD-10-CM

## 2024-12-10 RX ORDER — BUPROPION HYDROCHLORIDE 300 MG/1
300 TABLET ORAL DAILY
Qty: 30 TABLET | Refills: 3 | Status: CANCELLED | OUTPATIENT
Start: 2024-12-10

## 2024-12-12 DIAGNOSIS — F41.9 ANXIETY: ICD-10-CM

## 2024-12-12 DIAGNOSIS — F43.9 STRESS: ICD-10-CM

## 2024-12-12 RX ORDER — BUPROPION HYDROCHLORIDE 300 MG/1
300 TABLET ORAL DAILY
Qty: 30 TABLET | Refills: 0 | Status: SHIPPED | OUTPATIENT
Start: 2024-12-12

## 2024-12-12 NOTE — TELEPHONE ENCOUNTER
Patient due for follow up visit. LVM for pt to return call and schedule apt. Can be mychart video visit.

## 2025-01-13 DIAGNOSIS — F43.9 STRESS: ICD-10-CM

## 2025-01-13 DIAGNOSIS — F41.9 ANXIETY: ICD-10-CM

## 2025-01-13 RX ORDER — BUPROPION HYDROCHLORIDE 300 MG/1
300 TABLET ORAL DAILY
Qty: 30 TABLET | Refills: 0 | Status: SHIPPED | OUTPATIENT
Start: 2025-01-13

## 2025-01-28 ENCOUNTER — OFFICE VISIT (OUTPATIENT)
Dept: INTERNAL MEDICINE | Facility: CLINIC | Age: 55
End: 2025-01-28
Payer: COMMERCIAL

## 2025-01-28 VITALS
OXYGEN SATURATION: 100 % | RESPIRATION RATE: 14 BRPM | WEIGHT: 208.4 LBS | DIASTOLIC BLOOD PRESSURE: 70 MMHG | HEART RATE: 68 BPM | HEIGHT: 64 IN | BODY MASS INDEX: 35.58 KG/M2 | TEMPERATURE: 98 F | SYSTOLIC BLOOD PRESSURE: 126 MMHG

## 2025-01-28 DIAGNOSIS — F41.9 ANXIETY: ICD-10-CM

## 2025-01-28 DIAGNOSIS — F43.9 STRESS: ICD-10-CM

## 2025-01-28 DIAGNOSIS — Z12.11 SCREEN FOR COLON CANCER: Primary | ICD-10-CM

## 2025-01-28 RX ORDER — BUPROPION HYDROCHLORIDE 300 MG/1
300 TABLET ORAL DAILY
Qty: 90 TABLET | Refills: 1 | Status: SHIPPED | OUTPATIENT
Start: 2025-01-28

## 2025-01-28 NOTE — PROGRESS NOTES
Subjective   Елена Sellers is a 54 y.o. female.     Chief Complaint   Patient presents with    Anxiety    Stress       PCP: Nisreen Rosas APRN      Refill meds  Symptoms are: chronic.   Onset was 6 to 12 months.   Symptoms occur: daily.  Pertinent negative symptoms include no abdominal pain, no anorexia, no joint pain, no change in stool, no chest pain, no chills, no congestion, no cough, no diaphoresis, no fatigue, no fever, no headaches, no joint swelling, no myalgias, no nausea, no neck pain, no numbness, no rash, no sore throat, no swollen glands, no dysuria, no vertigo, no visual change, no vomiting and no weakness.        History of Present Illness  The patient is a 54-year-old female who is here to follow up on anxiety and stress. She is currently on bupropion 300 mg daily.    She reports uncertainty regarding the efficacy of her current medication, although she acknowledges an improvement in her condition since its initiation. She does not experience daily stress but admits to occasional periods of increased stress. She does not report feelings of restlessness, jitteriness, depression, or hopelessness. She expresses satisfaction with her current treatment regimen.    She has a mammogram scheduled for tomorrow morning. She declines COVID-19 booster and shingles vaccine. She did Cologuard 3 years ago.    MEDICATIONS  bupropion    IMMUNIZATIONS  She declined COVID-19 booster and shingles vaccine.  PHQ-9 Depression Screening  Little interest or pleasure in doing things? Not at all   Feeling down, depressed, or hopeless? Several days   PHQ-2 Total Score 1   Trouble falling or staying asleep, or sleeping too much?     Feeling tired or having little energy?     Poor appetite or overeating?     Feeling bad about yourself - or that you are a failure or have let yourself or your family down?     Trouble concentrating on things, such as reading the newspaper or watching television?     Moving or speaking so  slowly that other people could have noticed? Or the opposite - being so fidgety or restless that you have been moving around a lot more than usual?     Thoughts that you would be better off dead, or of hurting yourself in some way?     PHQ-9 Total Score     If you checked off any problems, how difficult have these problems made it for you to do your work, take care of things at home, or get along with other people? Not difficult at all       Anxiety SHASHANK-7    Feeling nervous, anxious or on edge: Not at all  Not being able to stop or control worrying: Several days  Worrying too much about different things: Several days  Trouble Relaxing: Several days  Being so restless that it is hard to sit still: Several days  Becoming easily annoyed or irritable: Several days  Feeling afraid as if something awful might happen: Not at all  SHASHANK 7 Total Score: 5  If you checked any problems, how difficult have these problems made it for you to do your work, take care of things at home, or get along with other people: Not difficult at all       Results      Lab Results   Component Value Date    WBC 7.28 07/16/2024    HGB 13.4 07/16/2024    HCT 41.2 07/16/2024    MCV 87.5 07/16/2024     07/16/2024     Lab Results   Component Value Date    GLUCOSE 83 07/16/2024    BUN 11 07/16/2024    CREATININE 0.71 07/16/2024    EGFR 101.8 07/16/2024    BCR 15.5 07/16/2024    K 4.6 07/16/2024    CO2 24.0 07/16/2024    CALCIUM 9.5 07/16/2024    ALBUMIN 4.5 07/16/2024    BILITOT 0.4 07/16/2024    AST 24 07/16/2024    ALT 19 07/16/2024     Lab Results   Component Value Date    CHOL 236 (H) 07/16/2024    TRIG 126 07/16/2024    HDL 64 (H) 07/16/2024     (H) 07/16/2024     Lab Results   Component Value Date    TSH 1.970 07/16/2024     A1C Last 3 Results          7/16/2024    12:12   HGBA1C Last 3 Results   Hemoglobin A1C 5.10        The following portions of the patient's history were reviewed and updated as appropriate: allergies, current  "medications, past family history, past medical history, past social history, past surgical history and problem list.        Outpatient Medications Marked as Taking for the 1/28/25 encounter (Office Visit) with Nisreen RosasSELIN   Medication Sig Dispense Refill    buPROPion XL (Wellbutrin XL) 300 MG 24 hr tablet Take 1 tablet by mouth Daily. 90 tablet 1    [DISCONTINUED] buPROPion XL (Wellbutrin XL) 300 MG 24 hr tablet Take 1 tablet by mouth Daily. 30 tablet 0     Allergies   Allergen Reactions    Oxycodone Nausea And Vomiting    Codeine Nausea And Vomiting     As a child per pt            Objective     Vitals:    01/28/25 1043   BP: 126/70   BP Location: Left arm   Patient Position: Sitting   Cuff Size: Adult   Pulse: 68   Resp: 14   Temp: 98 °F (36.7 °C)   TempSrc: Infrared   SpO2: 100%   Weight: 94.5 kg (208 lb 6.4 oz)   Height: 162.6 cm (64.02\")     Body mass index is 35.75 kg/m².  Wt Readings from Last 3 Encounters:   01/28/25 94.5 kg (208 lb 6.4 oz)   08/06/24 96 kg (211 lb 9.6 oz)   07/16/24 95.9 kg (211 lb 6.4 oz)       Physical Exam  Constitutional:       Appearance: Normal appearance. She is well-developed.   HENT:      Head: Normocephalic and atraumatic.   Eyes:      General: No scleral icterus.     Conjunctiva/sclera: Conjunctivae normal.   Cardiovascular:      Rate and Rhythm: Normal rate and regular rhythm.      Heart sounds: Normal heart sounds.   Pulmonary:      Effort: Pulmonary effort is normal. No respiratory distress.      Breath sounds: Normal breath sounds.   Abdominal:      General: Bowel sounds are normal.      Palpations: Abdomen is soft.      Tenderness: There is no abdominal tenderness.   Musculoskeletal:         General: Normal range of motion.      Cervical back: Normal range of motion.      Right lower leg: No edema.      Left lower leg: No edema.   Skin:     General: Skin is warm and dry.   Neurological:      General: No focal deficit present.      Mental Status: She is alert and " oriented to person, place, and time.   Psychiatric:         Attention and Perception: Attention normal.         Mood and Affect: Mood and affect normal.         Behavior: Behavior normal. Behavior is cooperative.         Thought Content: Thought content normal.         Cognition and Memory: Cognition normal.         Judgment: Judgment normal.                   Assessment & Plan   Diagnoses and all orders for this visit:    1. Screen for colon cancer (Primary)  -     Cologuard - Stool, Per Rectum; Future    2. Anxiety  -     buPROPion XL (Wellbutrin XL) 300 MG 24 hr tablet; Take 1 tablet by mouth Daily.  Dispense: 90 tablet; Refill: 1    3. Stress  -     buPROPion XL (Wellbutrin XL) 300 MG 24 hr tablet; Take 1 tablet by mouth Daily.  Dispense: 90 tablet; Refill: 1        Assessment & Plan  1. Anxiety and stress.  Her SHASHANK-7 score is 4, indicating a manageable level of anxiety. She is advised to continue self-monitoring her symptoms and to communicate any need for medication adjustment. She will continue her current regimen of bupropion 300 mg daily. A prescription refill for a 90-day supply of bupropion 300 mg has been provided. If her symptoms worsen or become unmanageable, an increase in the bupropion dosage to 450 mg may be considered. Alternatively, augmentation therapy with additional medications could be explored.    2. Health Maintenance.  She has a mammogram scheduled for tomorrow. An order for Cologuard has been placed, as it is due in March.            Return in about 6 months (around 7/28/2025) for Annual.    I discussed my findings,recommendations, and plan of care was with the patient. They verbalized understanding and agreement.  Patient was encouraged to keep me informed of any acute changes, lack of improvement, or any new concerning symptoms.     Patient or patient representative verbalized consent for the use of Ambient Listening during the visit with  SELIN Hartman for chart documentation.  1/29/2025  12:41 EST

## 2025-06-27 ENCOUNTER — PATIENT MESSAGE (OUTPATIENT)
Dept: INTERNAL MEDICINE | Facility: CLINIC | Age: 55
End: 2025-06-27
Payer: COMMERCIAL

## 2025-08-27 ENCOUNTER — OFFICE VISIT (OUTPATIENT)
Dept: INTERNAL MEDICINE | Facility: CLINIC | Age: 55
End: 2025-08-27
Payer: MEDICAID

## 2025-08-27 VITALS
RESPIRATION RATE: 16 BRPM | WEIGHT: 215.6 LBS | OXYGEN SATURATION: 99 % | TEMPERATURE: 97.1 F | HEART RATE: 72 BPM | BODY MASS INDEX: 36.81 KG/M2 | SYSTOLIC BLOOD PRESSURE: 128 MMHG | HEIGHT: 64 IN | DIASTOLIC BLOOD PRESSURE: 76 MMHG

## 2025-08-27 DIAGNOSIS — F41.9 ANXIETY: ICD-10-CM

## 2025-08-27 DIAGNOSIS — F43.9 STRESS: ICD-10-CM

## 2025-08-27 DIAGNOSIS — Z79.899 HIGH RISK MEDICATION USE: ICD-10-CM

## 2025-08-27 DIAGNOSIS — E66.812 CLASS 2 OBESITY DUE TO EXCESS CALORIES WITHOUT SERIOUS COMORBIDITY WITH BODY MASS INDEX (BMI) OF 37.0 TO 37.9 IN ADULT: Primary | ICD-10-CM

## 2025-08-27 DIAGNOSIS — E66.09 CLASS 2 OBESITY DUE TO EXCESS CALORIES WITHOUT SERIOUS COMORBIDITY WITH BODY MASS INDEX (BMI) OF 37.0 TO 37.9 IN ADULT: Primary | ICD-10-CM

## 2025-08-27 RX ORDER — BUPROPION HYDROCHLORIDE 300 MG/1
300 TABLET ORAL DAILY
Qty: 90 TABLET | Refills: 1 | Status: CANCELLED | OUTPATIENT
Start: 2025-08-27

## 2025-08-27 RX ORDER — PHENTERMINE HYDROCHLORIDE 15 MG/1
15 CAPSULE ORAL EVERY MORNING
Qty: 30 CAPSULE | Refills: 2 | Status: SHIPPED | OUTPATIENT
Start: 2025-08-27

## 2025-08-28 RX ORDER — BUPROPION HYDROCHLORIDE 300 MG/1
300 TABLET ORAL DAILY
Qty: 90 TABLET | Refills: 1 | Status: SHIPPED | OUTPATIENT
Start: 2025-08-28

## (undated) DEVICE — SUT VIC 1 CTX 36IN OBGYN VCP977H

## (undated) DEVICE — SUT MONOCRYL PLS ANTIB UND 3/0  PS1 27IN

## (undated) DEVICE — TB SXN FRAZIER 12F STRL

## (undated) DEVICE — SYR LUERLOK 30CC

## (undated) DEVICE — BLANKT WARM UPPR/BDY ARM/OUT 57X196CM

## (undated) DEVICE — TRY EPID SFTY 18G 3.5IN 1T7680

## (undated) DEVICE — NEEDLE, QUINCKE 22GX3.5": Brand: MEDLINE INDUSTRIES, INC.

## (undated) DEVICE — PAD ARMBRD SURG CONVOL 7.5X20X2IN

## (undated) DEVICE — STRYKER PERFORMANCE SERIES SAGITTAL BLADE: Brand: STRYKER PERFORMANCE SERIES

## (undated) DEVICE — BNDG ELAS W/CLIP 6IN 10YD LF STRL

## (undated) DEVICE — UNDERCAST PADDING: Brand: DEROYAL

## (undated) DEVICE — BNDG ELAS CO-FLEX SLF ADHR 6IN 5YD LF STRL

## (undated) DEVICE — PIN HOLD TEMP NOHEAD FLUT 1/8X3.5IN

## (undated) DEVICE — ANTIBACTERIAL UNDYED BRAIDED (POLYGLACTIN 910), SYNTHETIC ABSORBABLE SUTURE: Brand: COATED VICRYL

## (undated) DEVICE — KT PUMP INFUBLOCK MDL 2100 PMKITSOLIS

## (undated) DEVICE — NDL HYPO ECLPS SFTY 18G 1 1/2IN

## (undated) DEVICE — GLV SURG SENSICARE PI ORTHO SZ8 LF STRL

## (undated) DEVICE — DISPOSABLE TOURNIQUET CUFF SINGLE BLADDER, DUAL PORT AND QUICK CONNECT CONNECTOR: Brand: COLOR CUFF

## (undated) DEVICE — PK KN TOTL 10

## (undated) DEVICE — ADHS SKIN PREMIERPRO EXOFIN TOPICAL HI/VISC .5ML

## (undated) DEVICE — UNDERGLV SURG BIOGEL INDICAT PI SZ8 BLU

## (undated) DEVICE — STERILE PVP: Brand: MEDLINE INDUSTRIES, INC.

## (undated) DEVICE — TRAP FLD MINIVAC MEGADYNE 100ML

## (undated) DEVICE — PENCL ROCKRSWCH MEGADYNE W/HOLSTR 10FT SS

## (undated) DEVICE — 1010 S-DRAPE TOWEL DRAPE 10/BX: Brand: STERI-DRAPE™

## (undated) DEVICE — GLV SURG SENSICARE PI MIC PF SZ9 LF STRL

## (undated) DEVICE — DRSNG SURG AQUACEL AG/ADVNTGE 9X25CM 3.5X10IN

## (undated) DEVICE — PATIENT RETURN ELECTRODE, SINGLE-USE, CONTACT QUALITY MONITORING, ADULT, WITH 9FT CORD, FOR PATIENTS WEIGING OVER 33LBS. (15KG): Brand: MEGADYNE

## (undated) DEVICE — TBG PENCL TELESCP MEGADYNE SMOKE EVAC 10FT